# Patient Record
Sex: MALE | Race: WHITE | Employment: FULL TIME | URBAN - METROPOLITAN AREA
[De-identification: names, ages, dates, MRNs, and addresses within clinical notes are randomized per-mention and may not be internally consistent; named-entity substitution may affect disease eponyms.]

---

## 2023-04-20 ENCOUNTER — APPOINTMENT (OUTPATIENT)
Dept: PRIMARY CARE | Facility: CLINIC | Age: 53
End: 2023-04-20
Payer: COMMERCIAL

## 2023-04-20 ENCOUNTER — OFFICE VISIT (OUTPATIENT)
Dept: PRIMARY CARE | Facility: CLINIC | Age: 53
End: 2023-04-20
Payer: COMMERCIAL

## 2023-04-20 VITALS
HEIGHT: 71 IN | DIASTOLIC BLOOD PRESSURE: 84 MMHG | OXYGEN SATURATION: 98 % | SYSTOLIC BLOOD PRESSURE: 128 MMHG | BODY MASS INDEX: 30.66 KG/M2 | WEIGHT: 219 LBS | HEART RATE: 74 BPM | RESPIRATION RATE: 14 BRPM

## 2023-04-20 DIAGNOSIS — E78.2 MIXED HYPERLIPIDEMIA: ICD-10-CM

## 2023-04-20 DIAGNOSIS — K21.9 GASTROESOPHAGEAL REFLUX DISEASE WITHOUT ESOPHAGITIS: ICD-10-CM

## 2023-04-20 DIAGNOSIS — M79.671 CHRONIC PAIN OF BOTH FEET: ICD-10-CM

## 2023-04-20 DIAGNOSIS — R45.89 DEPRESSED MOOD: ICD-10-CM

## 2023-04-20 DIAGNOSIS — I10 PRIMARY HYPERTENSION: ICD-10-CM

## 2023-04-20 DIAGNOSIS — N52.9 ERECTILE DYSFUNCTION, UNSPECIFIED ERECTILE DYSFUNCTION TYPE: ICD-10-CM

## 2023-04-20 DIAGNOSIS — G89.29 CHRONIC PAIN OF BOTH FEET: ICD-10-CM

## 2023-04-20 DIAGNOSIS — G62.9 NEUROPATHY: Primary | ICD-10-CM

## 2023-04-20 DIAGNOSIS — M79.672 CHRONIC PAIN OF BOTH FEET: ICD-10-CM

## 2023-04-20 DIAGNOSIS — R40.0 DAYTIME SLEEPINESS: ICD-10-CM

## 2023-04-20 PROBLEM — R79.89 LOW TESTOSTERONE: Status: ACTIVE | Noted: 2023-04-20

## 2023-04-20 PROBLEM — G47.30 SLEEP APNEA: Status: ACTIVE | Noted: 2023-04-20

## 2023-04-20 PROBLEM — Z86.010 PERSONAL HISTORY OF COLONIC POLYPS: Status: ACTIVE | Noted: 2023-04-20

## 2023-04-20 PROBLEM — R94.128 ABNORMAL TYMPANOGRAM: Status: ACTIVE | Noted: 2023-04-20

## 2023-04-20 PROBLEM — N50.89 TESTICULAR MASS: Status: ACTIVE | Noted: 2023-04-20

## 2023-04-20 PROBLEM — Z86.0100 PERSONAL HISTORY OF COLONIC POLYPS: Status: ACTIVE | Noted: 2023-04-20

## 2023-04-20 PROBLEM — E78.5 HYPERLIPIDEMIA: Status: ACTIVE | Noted: 2023-04-20

## 2023-04-20 PROBLEM — K42.9 UMBILICAL HERNIA WITHOUT OBSTRUCTION AND WITHOUT GANGRENE: Status: ACTIVE | Noted: 2023-04-20

## 2023-04-20 PROBLEM — C62.91: Status: ACTIVE | Noted: 2023-04-20

## 2023-04-20 PROBLEM — R31.21 ASYMPTOMATIC MICROSCOPIC HEMATURIA: Status: ACTIVE | Noted: 2023-04-20

## 2023-04-20 PROCEDURE — 99204 OFFICE O/P NEW MOD 45 MIN: CPT | Performed by: INTERNAL MEDICINE

## 2023-04-20 PROCEDURE — 3074F SYST BP LT 130 MM HG: CPT | Performed by: INTERNAL MEDICINE

## 2023-04-20 PROCEDURE — 3079F DIAST BP 80-89 MM HG: CPT | Performed by: INTERNAL MEDICINE

## 2023-04-20 RX ORDER — TADALAFIL 5 MG/1
5 TABLET ORAL DAILY
COMMUNITY
Start: 2022-09-09 | End: 2024-04-24 | Stop reason: ALTCHOICE

## 2023-04-20 RX ORDER — DICLOFENAC SODIUM 10 MG/G
4 GEL TOPICAL 4 TIMES DAILY PRN
COMMUNITY
Start: 2022-12-09 | End: 2023-06-01 | Stop reason: ALTCHOICE

## 2023-04-20 RX ORDER — METOPROLOL SUCCINATE 25 MG/1
25 TABLET, EXTENDED RELEASE ORAL DAILY
COMMUNITY
End: 2023-05-09 | Stop reason: SDUPTHER

## 2023-04-20 RX ORDER — ALBUTEROL SULFATE 90 UG/1
2 AEROSOL, METERED RESPIRATORY (INHALATION) EVERY 4 HOURS PRN
COMMUNITY
Start: 2022-12-12 | End: 2023-06-01 | Stop reason: ALTCHOICE

## 2023-04-20 RX ORDER — FLUTICASONE PROPIONATE 50 MCG
2 SPRAY, SUSPENSION (ML) NASAL 2 TIMES DAILY
COMMUNITY
Start: 2023-01-06 | End: 2023-06-01 | Stop reason: ALTCHOICE

## 2023-04-20 RX ORDER — SILDENAFIL 100 MG/1
100 TABLET, FILM COATED ORAL AS NEEDED
COMMUNITY

## 2023-04-20 RX ORDER — PANTOPRAZOLE SODIUM 40 MG/1
40 TABLET, DELAYED RELEASE ORAL DAILY
COMMUNITY
End: 2023-04-25 | Stop reason: SDUPTHER

## 2023-04-20 RX ORDER — AZELASTINE 1 MG/ML
2 SPRAY, METERED NASAL 2 TIMES DAILY
COMMUNITY
Start: 2023-04-16 | End: 2023-06-01 | Stop reason: ALTCHOICE

## 2023-04-20 RX ORDER — ERGOCALCIFEROL 1.25 MG/1
50000 CAPSULE ORAL
COMMUNITY
Start: 2022-08-28 | End: 2023-05-09 | Stop reason: SDUPTHER

## 2023-04-20 RX ORDER — CLOTRIMAZOLE 1 G/ML
SOLUTION TOPICAL
COMMUNITY
Start: 2022-12-06 | End: 2023-06-01 | Stop reason: ALTCHOICE

## 2023-04-20 RX ORDER — AMLODIPINE BESYLATE 2.5 MG/1
2.5 TABLET ORAL DAILY
COMMUNITY
Start: 2023-02-21 | End: 2023-05-09 | Stop reason: SDUPTHER

## 2023-04-20 RX ORDER — GABAPENTIN 300 MG/1
300 CAPSULE ORAL 3 TIMES DAILY
Qty: 90 CAPSULE | Refills: 3 | Status: SHIPPED | OUTPATIENT
Start: 2023-04-20 | End: 2023-04-24 | Stop reason: SDUPTHER

## 2023-04-20 RX ORDER — IBUPROFEN 400 MG/1
400 TABLET ORAL EVERY 8 HOURS PRN
COMMUNITY
Start: 2020-12-29 | End: 2023-06-01 | Stop reason: ALTCHOICE

## 2023-04-20 RX ORDER — AMITRIPTYLINE HCL
POWDER (GRAM) MISCELLANEOUS
COMMUNITY
Start: 2023-04-10 | End: 2023-06-01

## 2023-04-20 RX ORDER — ATORVASTATIN CALCIUM 10 MG/1
10 TABLET, FILM COATED ORAL DAILY
COMMUNITY
End: 2023-04-21 | Stop reason: SDUPTHER

## 2023-04-20 RX ORDER — MELOXICAM 7.5 MG/1
7.5 TABLET ORAL DAILY
COMMUNITY
Start: 2022-12-05 | End: 2023-06-01 | Stop reason: ALTCHOICE

## 2023-04-20 RX ORDER — TESTOSTERONE CYPIONATE 200 MG/ML
200 INJECTION, SOLUTION INTRAMUSCULAR
COMMUNITY
End: 2024-04-24 | Stop reason: SDUPTHER

## 2023-04-20 ASSESSMENT — LIFESTYLE VARIABLES
AUDIT-C TOTAL SCORE: 0
SKIP TO QUESTIONS 9-10: 1
HOW OFTEN DO YOU HAVE A DRINK CONTAINING ALCOHOL: NEVER
HOW MANY STANDARD DRINKS CONTAINING ALCOHOL DO YOU HAVE ON A TYPICAL DAY: PATIENT DOES NOT DRINK
HOW OFTEN DO YOU HAVE SIX OR MORE DRINKS ON ONE OCCASION: NEVER

## 2023-04-20 ASSESSMENT — PATIENT HEALTH QUESTIONNAIRE - PHQ9
2. FEELING DOWN, DEPRESSED OR HOPELESS: NOT AT ALL
SUM OF ALL RESPONSES TO PHQ9 QUESTIONS 1 & 2: 0
1. LITTLE INTEREST OR PLEASURE IN DOING THINGS: NOT AT ALL

## 2023-04-20 ASSESSMENT — PAIN SCALES - GENERAL: PAINLEVEL: 8

## 2023-04-21 DIAGNOSIS — G62.9 NEUROPATHY: ICD-10-CM

## 2023-04-21 DIAGNOSIS — E78.5 HYPERLIPIDEMIA, UNSPECIFIED HYPERLIPIDEMIA TYPE: ICD-10-CM

## 2023-04-22 ENCOUNTER — LAB (OUTPATIENT)
Dept: LAB | Facility: LAB | Age: 53
End: 2023-04-22
Payer: COMMERCIAL

## 2023-04-22 DIAGNOSIS — G62.9 NEUROPATHY: ICD-10-CM

## 2023-04-22 DIAGNOSIS — R40.0 DAYTIME SLEEPINESS: ICD-10-CM

## 2023-04-22 PROCEDURE — 36415 COLL VENOUS BLD VENIPUNCTURE: CPT

## 2023-04-22 PROCEDURE — 83036 HEMOGLOBIN GLYCOSYLATED A1C: CPT

## 2023-04-23 LAB
ESTIMATED AVERAGE GLUCOSE FOR HBA1C: 103 MG/DL
HEMOGLOBIN A1C/HEMOGLOBIN TOTAL IN BLOOD: 5.2 %

## 2023-04-25 DIAGNOSIS — K21.9 GASTROESOPHAGEAL REFLUX DISEASE WITHOUT ESOPHAGITIS: ICD-10-CM

## 2023-04-25 RX ORDER — ATORVASTATIN CALCIUM 10 MG/1
10 TABLET, FILM COATED ORAL DAILY
Qty: 90 TABLET | Refills: 3 | Status: SHIPPED | OUTPATIENT
Start: 2023-04-25 | End: 2024-05-02

## 2023-04-25 RX ORDER — PANTOPRAZOLE SODIUM 40 MG/1
40 TABLET, DELAYED RELEASE ORAL DAILY
Qty: 90 TABLET | Refills: 3 | Status: SHIPPED | OUTPATIENT
Start: 2023-04-25 | End: 2024-05-30

## 2023-04-25 RX ORDER — GABAPENTIN 300 MG/1
300 CAPSULE ORAL 3 TIMES DAILY
Qty: 90 CAPSULE | Refills: 3 | Status: SHIPPED | OUTPATIENT
Start: 2023-04-25 | End: 2024-01-24 | Stop reason: SDUPTHER

## 2023-05-03 NOTE — PROGRESS NOTES
"Subjective   Jose Luis Paulson is a 52 y.o. male who presents for feet pain (Pt experiencing foot pain bilaterally. States he is on his feet a lot for work and noted them hurting quite badly 9/10. Pt states it is a dull throbbing pain. No edema noted. ).  Patient presents to establish care.  He request prescription refills.  He complains of bilateral foot pain.  Described as a dull ache.  Rated 9 out of 10.  Patient sees podiatry regularly.  He starts physical therapy tomorrow.  He had an MRI of his lumbar spine at an outside hospital ordered by podiatry.        Objective     /84 (BP Location: Left arm, Patient Position: Sitting, BP Cuff Size: Large adult)   Pulse 74   Resp 14   Ht 1.803 m (5' 11\")   Wt 99.3 kg (219 lb)   SpO2 98%   BMI 30.54 kg/m²      Physical Exam  Constitutional:       Appearance: Normal appearance.   HENT:      Head: Normocephalic and atraumatic.      Nose: Nose normal.      Mouth/Throat:      Mouth: Mucous membranes are moist.      Pharynx: Oropharynx is clear.   Eyes:      Extraocular Movements: Extraocular movements intact.      Pupils: Pupils are equal, round, and reactive to light.   Cardiovascular:      Rate and Rhythm: Normal rate and regular rhythm.   Pulmonary:      Effort: No respiratory distress.      Breath sounds: Normal breath sounds. No wheezing, rhonchi or rales.   Abdominal:      General: Bowel sounds are normal. There is no distension.      Palpations: Abdomen is soft.      Tenderness: There is no abdominal tenderness. There is no guarding.   Musculoskeletal:      Right lower leg: No edema.      Left lower leg: No edema.   Skin:     General: Skin is warm and dry.   Neurological:      Mental Status: He is alert and oriented to person, place, and time. Mental status is at baseline.   Psychiatric:         Mood and Affect: Mood normal.         Behavior: Behavior normal.         Assessment/Plan   Problem List Items Addressed This Visit          Nervous    Daytime sleepiness    " Relevant Orders    Hemoglobin A1c (Completed)       Circulatory    Hypertension       Digestive    GERD (gastroesophageal reflux disease)       Genitourinary    Erectile dysfunction       Musculoskeletal    Chronic pain of both feet       Other    Depressed mood    Hyperlipidemia     Other Visit Diagnoses       Neuropathy    -  Primary    Relevant Orders    Hemoglobin A1c (Completed)          Check hemoglobin A1c  Start gabapentin 300 mg 3 times daily  Continue all other medications as previously prescribed       Hai Sanchez DO

## 2023-05-09 DIAGNOSIS — I10 PRIMARY HYPERTENSION: ICD-10-CM

## 2023-05-09 DIAGNOSIS — R40.0 DAYTIME SLEEPINESS: ICD-10-CM

## 2023-05-09 RX ORDER — ERGOCALCIFEROL 1.25 MG/1
50000 CAPSULE ORAL
Qty: 12 CAPSULE | Refills: 3 | Status: SHIPPED | OUTPATIENT
Start: 2023-05-09 | End: 2024-05-28

## 2023-05-09 RX ORDER — METOPROLOL SUCCINATE 25 MG/1
25 TABLET, EXTENDED RELEASE ORAL DAILY
Qty: 90 TABLET | Refills: 3 | Status: SHIPPED | OUTPATIENT
Start: 2023-05-09 | End: 2024-05-30

## 2023-05-09 RX ORDER — AMLODIPINE BESYLATE 2.5 MG/1
2.5 TABLET ORAL DAILY
Qty: 90 TABLET | Refills: 3 | Status: SHIPPED | OUTPATIENT
Start: 2023-05-09

## 2023-05-25 ENCOUNTER — APPOINTMENT (OUTPATIENT)
Dept: PRIMARY CARE | Facility: CLINIC | Age: 53
End: 2023-05-25
Payer: COMMERCIAL

## 2023-05-25 PROBLEM — E66.811 OBESITY (BMI 30.0-34.9): Status: ACTIVE | Noted: 2023-05-25

## 2023-05-25 PROBLEM — R74.8 ABNORMAL LIVER ENZYMES: Status: ACTIVE | Noted: 2021-03-23

## 2023-05-25 PROBLEM — F17.210 CIGARETTE NICOTINE DEPENDENCE WITHOUT COMPLICATION: Status: ACTIVE | Noted: 2023-05-25

## 2023-05-25 PROBLEM — E66.9 OBESITY (BMI 30.0-34.9): Status: ACTIVE | Noted: 2023-05-25

## 2023-05-25 PROBLEM — B00.1 HERPESVIRAL VESICULAR DERMATITIS: Status: ACTIVE | Noted: 2020-01-11

## 2023-05-25 PROBLEM — E29.1 TESTICULAR HYPOFUNCTION: Status: ACTIVE | Noted: 2021-07-04

## 2023-05-25 PROBLEM — L30.1: Status: ACTIVE | Noted: 2020-07-26

## 2023-05-25 RX ORDER — PREDNISONE 50 MG/1
TABLET ORAL
COMMUNITY
Start: 2019-04-11 | End: 2023-06-01 | Stop reason: ALTCHOICE

## 2023-05-25 RX ORDER — TRAZODONE HYDROCHLORIDE 50 MG/1
TABLET ORAL
COMMUNITY
Start: 2019-07-11 | End: 2023-06-01 | Stop reason: ALTCHOICE

## 2023-05-25 RX ORDER — MUPIROCIN CALCIUM 20 MG/G
CREAM TOPICAL
COMMUNITY
Start: 2022-09-23 | End: 2023-06-01 | Stop reason: ALTCHOICE

## 2023-05-25 RX ORDER — MONTELUKAST SODIUM 10 MG/1
TABLET ORAL
COMMUNITY
Start: 2019-07-18 | End: 2023-06-01 | Stop reason: ALTCHOICE

## 2023-05-25 RX ORDER — NAPROXEN 500 MG/1
TABLET ORAL
COMMUNITY
Start: 2018-06-26 | End: 2023-06-01 | Stop reason: ALTCHOICE

## 2023-06-01 ENCOUNTER — OFFICE VISIT (OUTPATIENT)
Dept: PRIMARY CARE | Facility: CLINIC | Age: 53
End: 2023-06-01
Payer: COMMERCIAL

## 2023-06-01 VITALS — SYSTOLIC BLOOD PRESSURE: 118 MMHG | DIASTOLIC BLOOD PRESSURE: 74 MMHG

## 2023-06-01 DIAGNOSIS — C62.11 MALIGNANT NEOPLASM OF DESCENDED RIGHT TESTIS (MULTI): ICD-10-CM

## 2023-06-01 DIAGNOSIS — G47.33 OBSTRUCTIVE SLEEP APNEA SYNDROME: ICD-10-CM

## 2023-06-01 DIAGNOSIS — Z86.010 PERSONAL HISTORY OF COLONIC POLYPS: ICD-10-CM

## 2023-06-01 DIAGNOSIS — M79.672 CHRONIC PAIN OF BOTH FEET: ICD-10-CM

## 2023-06-01 DIAGNOSIS — M79.671 CHRONIC PAIN OF BOTH FEET: ICD-10-CM

## 2023-06-01 DIAGNOSIS — G89.29 CHRONIC PAIN OF BOTH FEET: ICD-10-CM

## 2023-06-01 DIAGNOSIS — G62.9 NEUROPATHY: ICD-10-CM

## 2023-06-01 DIAGNOSIS — K21.9 GASTROESOPHAGEAL REFLUX DISEASE WITHOUT ESOPHAGITIS: ICD-10-CM

## 2023-06-01 DIAGNOSIS — N52.9 ERECTILE DYSFUNCTION, UNSPECIFIED ERECTILE DYSFUNCTION TYPE: ICD-10-CM

## 2023-06-01 DIAGNOSIS — I10 PRIMARY HYPERTENSION: ICD-10-CM

## 2023-06-01 DIAGNOSIS — R45.89 DEPRESSED MOOD: Primary | ICD-10-CM

## 2023-06-01 DIAGNOSIS — E66.9 OBESITY (BMI 30.0-34.9): ICD-10-CM

## 2023-06-01 DIAGNOSIS — L73.9 FOLLICULITIS: ICD-10-CM

## 2023-06-01 DIAGNOSIS — E78.2 MIXED HYPERLIPIDEMIA: ICD-10-CM

## 2023-06-01 PROCEDURE — 3074F SYST BP LT 130 MM HG: CPT | Performed by: INTERNAL MEDICINE

## 2023-06-01 PROCEDURE — 3078F DIAST BP <80 MM HG: CPT | Performed by: INTERNAL MEDICINE

## 2023-06-01 PROCEDURE — 99214 OFFICE O/P EST MOD 30 MIN: CPT | Performed by: INTERNAL MEDICINE

## 2023-06-01 RX ORDER — IBUPROFEN 600 MG/1
TABLET ORAL
COMMUNITY
Start: 2023-05-19

## 2023-06-01 RX ORDER — BACITRACIN ZINC 500 UNIT/G
OINTMENT (GRAM) TOPICAL 2 TIMES DAILY
Qty: 14 G | Refills: 1 | Status: SHIPPED | OUTPATIENT
Start: 2023-06-01

## 2023-06-01 RX ORDER — NEBULIZER AND COMPRESSOR
EACH MISCELLANEOUS
Qty: 1 EACH | Refills: 0 | Status: SHIPPED | OUTPATIENT
Start: 2023-06-01

## 2023-06-01 RX ORDER — MONTELUKAST SODIUM 10 MG/1
TABLET ORAL
COMMUNITY
End: 2024-04-24 | Stop reason: ALTCHOICE

## 2023-06-01 ASSESSMENT — PAIN SCALES - GENERAL: PAINLEVEL: 0-NO PAIN

## 2023-06-01 NOTE — PROGRESS NOTES
Subjective   Jose Luis Paulson is a 52 y.o. male who presents for Annual Exam.  Patient presents for a follow-up.  Blood pressure controlled in the office at 118/74.  No headaches or chest pain.  Patient does not check his blood pressure at home.  He does not have a blood pressure cuff.  Patient had a colonoscopy last month was found to have 8 polyps.  He will need to return in 3 months.  Labs reviewed, hemoglobin A1c 5.2%.  He has not started gabapentin yet for his foot pain.  He is following with podiatry ago.        Objective     /74      Physical Exam  Constitutional:       Appearance: Normal appearance.   HENT:      Head: Normocephalic and atraumatic.      Nose: Nose normal.      Mouth/Throat:      Mouth: Mucous membranes are moist.      Pharynx: Oropharynx is clear.   Eyes:      Extraocular Movements: Extraocular movements intact.      Pupils: Pupils are equal, round, and reactive to light.   Cardiovascular:      Rate and Rhythm: Normal rate and regular rhythm.   Pulmonary:      Effort: No respiratory distress.      Breath sounds: Normal breath sounds. No wheezing, rhonchi or rales.   Abdominal:      General: Bowel sounds are normal. There is no distension.      Palpations: Abdomen is soft.      Tenderness: There is no abdominal tenderness. There is no guarding.   Musculoskeletal:      Right lower leg: No edema.      Left lower leg: No edema.   Skin:     General: Skin is warm and dry.   Neurological:      Mental Status: He is alert and oriented to person, place, and time. Mental status is at baseline.   Psychiatric:         Mood and Affect: Mood normal.         Behavior: Behavior normal.         Assessment/Plan   Problem List Items Addressed This Visit       Chronic pain of both feet    Depressed mood - Primary    Erectile dysfunction    GERD (gastroesophageal reflux disease)    Hyperlipidemia    Hypertension    Relevant Medications    miscellaneous medical supply (Blood Pressure Cuff) misc    Other Relevant  Orders    CBC    Comprehensive Metabolic Panel    Lipid Panel    Prostate Specific Antigen    Thyroid Stimulating Hormone    Vitamin D 1,25 Dihydroxy    Personal history of colonic polyps    Right testicular cancer (CMS/HCC)    Sleep apnea    Obesity (BMI 30.0-34.9)     Other Visit Diagnoses       Neuropathy        Folliculitis        Relevant Medications    bacitracin 500 unit/gram ointment          Continue medications as previously prescribed  Repeat colonoscopy in 2023 due to colon polyps  Return in September for full physical       Hai Sanchez DO

## 2023-07-01 LAB
ALPHA-1 FETOPROTEIN (NG/ML) IN SER/PLAS: <4 NG/ML (ref 0–9)
ANION GAP IN SER/PLAS: 10 MMOL/L (ref 10–20)
CALCIUM (MG/DL) IN SER/PLAS: 8.8 MG/DL (ref 8.6–10.3)
CARBON DIOXIDE, TOTAL (MMOL/L) IN SER/PLAS: 25 MMOL/L (ref 21–32)
CHLORIDE (MMOL/L) IN SER/PLAS: 109 MMOL/L (ref 98–107)
CREATININE (MG/DL) IN SER/PLAS: 0.93 MG/DL (ref 0.5–1.3)
ERYTHROCYTE DISTRIBUTION WIDTH (RATIO) BY AUTOMATED COUNT: 12.7 % (ref 11.5–14.5)
ERYTHROCYTE MEAN CORPUSCULAR HEMOGLOBIN CONCENTRATION (G/DL) BY AUTOMATED: 33.6 G/DL (ref 32–36)
ERYTHROCYTE MEAN CORPUSCULAR VOLUME (FL) BY AUTOMATED COUNT: 98 FL (ref 80–100)
ERYTHROCYTES (10*6/UL) IN BLOOD BY AUTOMATED COUNT: 4.72 X10E12/L (ref 4.5–5.9)
ESTRADIOL (PG/ML) IN SER/PLAS: 26 PG/ML
GFR MALE: >90 ML/MIN/1.73M2
GLUCOSE (MG/DL) IN SER/PLAS: 89 MG/DL (ref 74–99)
HCG TUMOR MARKER: <3 IU/L
HEMATOCRIT (%) IN BLOOD BY AUTOMATED COUNT: 46.4 % (ref 41–52)
HEMOGLOBIN (G/DL) IN BLOOD: 15.6 G/DL (ref 13.5–17.5)
LEUKOCYTES (10*3/UL) IN BLOOD BY AUTOMATED COUNT: 6.6 X10E9/L (ref 4.4–11.3)
NRBC (PER 100 WBCS) BY AUTOMATED COUNT: 0 /100 WBC (ref 0–0)
PLATELETS (10*3/UL) IN BLOOD AUTOMATED COUNT: 157 X10E9/L (ref 150–450)
POTASSIUM (MMOL/L) IN SER/PLAS: 3.8 MMOL/L (ref 3.5–5.3)
PROSTATE SPECIFIC AG (NG/ML) IN SER/PLAS: 0.77 NG/ML (ref 0–4)
SODIUM (MMOL/L) IN SER/PLAS: 140 MMOL/L (ref 136–145)
TESTOSTERONE (NG/DL) IN SER/PLAS: 259 NG/DL (ref 240–1000)
UREA NITROGEN (MG/DL) IN SER/PLAS: 19 MG/DL (ref 6–23)

## 2024-01-10 ENCOUNTER — LAB (OUTPATIENT)
Dept: LAB | Facility: LAB | Age: 54
End: 2024-01-10
Payer: COMMERCIAL

## 2024-01-10 DIAGNOSIS — N50.9 DISORDER OF MALE GENITAL ORGANS, UNSPECIFIED: Primary | ICD-10-CM

## 2024-01-10 DIAGNOSIS — I10 PRIMARY HYPERTENSION: ICD-10-CM

## 2024-01-10 LAB
AFP SERPL-MCNC: <4 NG/ML (ref 0–9)
ALBUMIN SERPL BCP-MCNC: 4.3 G/DL (ref 3.4–5)
ALP SERPL-CCNC: 94 U/L (ref 33–120)
ALT SERPL W P-5'-P-CCNC: 56 U/L (ref 10–52)
ANION GAP SERPL CALC-SCNC: 11 MMOL/L (ref 10–20)
AST SERPL W P-5'-P-CCNC: 33 U/L (ref 9–39)
B-HCG SERPL-ACNC: <3 MIU/ML
BILIRUB SERPL-MCNC: 0.5 MG/DL (ref 0–1.2)
BUN SERPL-MCNC: 18 MG/DL (ref 6–23)
CALCIUM SERPL-MCNC: 9.8 MG/DL (ref 8.6–10.3)
CHLORIDE SERPL-SCNC: 104 MMOL/L (ref 98–107)
CHOLEST SERPL-MCNC: 129 MG/DL (ref 0–199)
CHOLESTEROL/HDL RATIO: 3.7
CO2 SERPL-SCNC: 29 MMOL/L (ref 21–32)
CREAT SERPL-MCNC: 0.89 MG/DL (ref 0.5–1.3)
EGFRCR SERPLBLD CKD-EPI 2021: >90 ML/MIN/1.73M*2
ERYTHROCYTE [DISTWIDTH] IN BLOOD BY AUTOMATED COUNT: 12.4 % (ref 11.5–14.5)
GLUCOSE SERPL-MCNC: 121 MG/DL (ref 74–99)
HCT VFR BLD AUTO: 46.7 % (ref 41–52)
HDLC SERPL-MCNC: 35 MG/DL
HGB BLD-MCNC: 16.3 G/DL (ref 13.5–17.5)
LDLC SERPL CALC-MCNC: 63 MG/DL
MCH RBC QN AUTO: 34.2 PG (ref 26–34)
MCHC RBC AUTO-ENTMCNC: 34.9 G/DL (ref 32–36)
MCV RBC AUTO: 98 FL (ref 80–100)
NON HDL CHOLESTEROL: 94 MG/DL (ref 0–149)
NRBC BLD-RTO: 0 /100 WBCS (ref 0–0)
PLATELET # BLD AUTO: 120 X10*3/UL (ref 150–450)
POTASSIUM SERPL-SCNC: 4.4 MMOL/L (ref 3.5–5.3)
PROT SERPL-MCNC: 7.2 G/DL (ref 6.4–8.2)
PSA SERPL-MCNC: 0.63 NG/ML
RBC # BLD AUTO: 4.76 X10*6/UL (ref 4.5–5.9)
SODIUM SERPL-SCNC: 140 MMOL/L (ref 136–145)
TRIGL SERPL-MCNC: 154 MG/DL (ref 0–149)
TSH SERPL-ACNC: 1.12 MIU/L (ref 0.44–3.98)
VLDL: 31 MG/DL (ref 0–40)
WBC # BLD AUTO: 4.7 X10*3/UL (ref 4.4–11.3)

## 2024-01-10 PROCEDURE — 85027 COMPLETE CBC AUTOMATED: CPT

## 2024-01-10 PROCEDURE — 80061 LIPID PANEL: CPT

## 2024-01-10 PROCEDURE — 84153 ASSAY OF PSA TOTAL: CPT

## 2024-01-10 PROCEDURE — 82652 VIT D 1 25-DIHYDROXY: CPT

## 2024-01-10 PROCEDURE — 84702 CHORIONIC GONADOTROPIN TEST: CPT

## 2024-01-10 PROCEDURE — 80053 COMPREHEN METABOLIC PANEL: CPT

## 2024-01-10 PROCEDURE — 82105 ALPHA-FETOPROTEIN SERUM: CPT

## 2024-01-10 PROCEDURE — 36415 COLL VENOUS BLD VENIPUNCTURE: CPT

## 2024-01-10 PROCEDURE — 84443 ASSAY THYROID STIM HORMONE: CPT

## 2024-01-12 LAB — 1,25(OH)2D3 SERPL-MCNC: 56.5 PG/ML (ref 19.9–79.3)

## 2024-01-24 ENCOUNTER — HOSPITAL ENCOUNTER (OUTPATIENT)
Dept: RADIOLOGY | Facility: HOSPITAL | Age: 54
Discharge: HOME | End: 2024-01-24
Payer: COMMERCIAL

## 2024-01-24 ENCOUNTER — OFFICE VISIT (OUTPATIENT)
Dept: PRIMARY CARE | Facility: CLINIC | Age: 54
End: 2024-01-24
Payer: COMMERCIAL

## 2024-01-24 VITALS
RESPIRATION RATE: 16 BRPM | SYSTOLIC BLOOD PRESSURE: 138 MMHG | BODY MASS INDEX: 29.4 KG/M2 | HEIGHT: 71 IN | DIASTOLIC BLOOD PRESSURE: 86 MMHG | WEIGHT: 210 LBS | HEART RATE: 78 BPM | OXYGEN SATURATION: 97 %

## 2024-01-24 DIAGNOSIS — J01.00 ACUTE NON-RECURRENT MAXILLARY SINUSITIS: ICD-10-CM

## 2024-01-24 DIAGNOSIS — G62.9 NEUROPATHY: ICD-10-CM

## 2024-01-24 DIAGNOSIS — R14.0 BLOATING: ICD-10-CM

## 2024-01-24 DIAGNOSIS — F17.210 CIGARETTE NICOTINE DEPENDENCE WITHOUT COMPLICATION: ICD-10-CM

## 2024-01-24 DIAGNOSIS — M79.671 CHRONIC PAIN OF BOTH FEET: ICD-10-CM

## 2024-01-24 DIAGNOSIS — M79.672 CHRONIC PAIN OF BOTH FEET: ICD-10-CM

## 2024-01-24 DIAGNOSIS — G89.29 CHRONIC PAIN OF BOTH FEET: ICD-10-CM

## 2024-01-24 DIAGNOSIS — N50.89 OTHER SPECIFIED DISORDERS OF THE MALE GENITAL ORGANS: ICD-10-CM

## 2024-01-24 DIAGNOSIS — I73.9 PVD (PERIPHERAL VASCULAR DISEASE) (CMS-HCC): Primary | ICD-10-CM

## 2024-01-24 DIAGNOSIS — I10 PRIMARY HYPERTENSION: ICD-10-CM

## 2024-01-24 PROCEDURE — 99214 OFFICE O/P EST MOD 30 MIN: CPT | Performed by: INTERNAL MEDICINE

## 2024-01-24 PROCEDURE — 74177 CT ABD & PELVIS W/CONTRAST: CPT | Performed by: RADIOLOGY

## 2024-01-24 PROCEDURE — 3075F SYST BP GE 130 - 139MM HG: CPT | Performed by: INTERNAL MEDICINE

## 2024-01-24 PROCEDURE — 2550000001 HC RX 255 CONTRASTS: Performed by: NURSE PRACTITIONER

## 2024-01-24 PROCEDURE — 3079F DIAST BP 80-89 MM HG: CPT | Performed by: INTERNAL MEDICINE

## 2024-01-24 PROCEDURE — 74177 CT ABD & PELVIS W/CONTRAST: CPT

## 2024-01-24 RX ORDER — AZITHROMYCIN 250 MG/1
TABLET, FILM COATED ORAL
Qty: 6 TABLET | Refills: 0 | Status: SHIPPED | OUTPATIENT
Start: 2024-01-24 | End: 2024-01-29

## 2024-01-24 RX ORDER — GABAPENTIN 400 MG/1
400 CAPSULE ORAL 3 TIMES DAILY
Qty: 90 CAPSULE | Refills: 3 | Status: SHIPPED | OUTPATIENT
Start: 2024-01-24 | End: 2024-07-22

## 2024-01-24 RX ORDER — IBUPROFEN 200 MG
1 TABLET ORAL DAILY
Qty: 90 CAPSULE | Refills: 0 | Status: SHIPPED | OUTPATIENT
Start: 2024-01-24 | End: 2024-04-24 | Stop reason: ALTCHOICE

## 2024-01-24 RX ADMIN — IOHEXOL 75 ML: 350 INJECTION, SOLUTION INTRAVENOUS at 13:45

## 2024-01-24 ASSESSMENT — PAIN SCALES - GENERAL: PAINLEVEL: 8

## 2024-01-25 NOTE — ASSESSMENT & PLAN NOTE
Increase gabapentin to 400 3 times daily  Referral for a second podiatry opinion  Check ABIs and PVRs

## 2024-01-25 NOTE — PROGRESS NOTES
"Subjective   Jose Luis Paulson is a 53 y.o. male who presents for Sick Visit (Foot pain bilaterally,dark black stools, sometimes liquid stools , very achy and missing a lot of work ).  Patient presents for foot pain.  It is in both feet.  He has a combination of osteoarthritis in his feet and neuropathy.  He is following with podiatry.  He is uncertain if gabapentin is helping.  However, it is not making him sleepy.  Patient noticed to have decreased hair on the lower parts of his legs.  His toes are cold to touch compared to the rest of his foot.  He is uncertain if he has had blood flow studies performed.  Patient complains of bloating.  Discussed Gas-X and probiotic.  Patient also complains of sinus congestion for the last 2+ weeks.  Symptoms initially improved but then got worse.  Discussed he likely had a viral infection followed by bacterial infection.        Objective     /86 (BP Location: Left arm, Patient Position: Sitting, BP Cuff Size: Adult)   Pulse 78   Resp 16   Ht 1.803 m (5' 11\")   Wt 95.3 kg (210 lb)   SpO2 97%   BMI 29.29 kg/m²      Physical Exam  Constitutional:       Appearance: Normal appearance.   HENT:      Head: Normocephalic and atraumatic.      Nose: Nose normal.      Mouth/Throat:      Mouth: Mucous membranes are moist.      Pharynx: Oropharynx is clear.   Eyes:      Extraocular Movements: Extraocular movements intact.      Pupils: Pupils are equal, round, and reactive to light.   Cardiovascular:      Rate and Rhythm: Normal rate and regular rhythm.   Pulmonary:      Effort: No respiratory distress.      Breath sounds: Normal breath sounds. No wheezing, rhonchi or rales.   Abdominal:      General: Bowel sounds are normal. There is no distension.      Palpations: Abdomen is soft.      Tenderness: There is no abdominal tenderness. There is no guarding.   Musculoskeletal:      Right lower leg: No edema.      Left lower leg: No edema.   Skin:     General: Skin is warm and dry. "   Neurological:      Mental Status: He is alert and oriented to person, place, and time. Mental status is at baseline.   Psychiatric:         Mood and Affect: Mood normal.         Behavior: Behavior normal.         Assessment/Plan   Problem List Items Addressed This Visit       Chronic pain of both feet     Increase gabapentin to 400 3 times daily  Referral for a second podiatry opinion  Check ABIs and PVRs         Hypertension     Continue current medications         Cigarette nicotine dependence without complication     Patient encouraged to stop smoking          Other Visit Diagnoses       PVD (peripheral vascular disease) (CMS/[facility])    -  Primary    Relevant Orders    Ankle brachial index    Vascular US PVR without exercise    Neuropathy        Relevant Medications    gabapentin (Neurontin) 400 mg capsule    Other Relevant Orders    Referral to Podiatry    Bloating        Relevant Medications    lactobacillus acidophilus (Acidophilus) capsule    Acute non-recurrent maxillary sinusitis        Relevant Medications    azithromycin (Zithromax) 250 mg tablet          Follow-up on IONA and PVR in 3 months  Will consider increasing gabapentin if indicated       Hai Sanchez DO

## 2024-02-02 ENCOUNTER — APPOINTMENT (OUTPATIENT)
Dept: UROLOGY | Facility: CLINIC | Age: 54
End: 2024-02-02
Payer: COMMERCIAL

## 2024-02-07 ENCOUNTER — HOSPITAL ENCOUNTER (OUTPATIENT)
Dept: VASCULAR MEDICINE | Facility: HOSPITAL | Age: 54
End: 2024-02-07
Payer: COMMERCIAL

## 2024-02-07 ENCOUNTER — HOSPITAL ENCOUNTER (OUTPATIENT)
Dept: VASCULAR MEDICINE | Facility: HOSPITAL | Age: 54
Discharge: HOME | End: 2024-02-07
Payer: COMMERCIAL

## 2024-02-07 DIAGNOSIS — I73.9 PVD (PERIPHERAL VASCULAR DISEASE) (CMS-HCC): ICD-10-CM

## 2024-02-07 DIAGNOSIS — I70.213 ATHEROSCLEROSIS OF NATIVE ARTERIES OF EXTREMITIES WITH INTERMITTENT CLAUDICATION, BILATERAL LEGS (CMS-HCC): ICD-10-CM

## 2024-02-07 PROCEDURE — 93922 UPR/L XTREMITY ART 2 LEVELS: CPT | Performed by: SURGERY

## 2024-02-07 PROCEDURE — 93922 UPR/L XTREMITY ART 2 LEVELS: CPT

## 2024-02-21 ENCOUNTER — OFFICE VISIT (OUTPATIENT)
Dept: PODIATRY | Facility: CLINIC | Age: 54
End: 2024-02-21
Payer: COMMERCIAL

## 2024-02-21 ENCOUNTER — LAB (OUTPATIENT)
Dept: LAB | Facility: LAB | Age: 54
End: 2024-02-21
Payer: COMMERCIAL

## 2024-02-21 ENCOUNTER — HOSPITAL ENCOUNTER (OUTPATIENT)
Dept: RADIOLOGY | Facility: CLINIC | Age: 54
Discharge: HOME | End: 2024-02-21
Payer: COMMERCIAL

## 2024-02-21 DIAGNOSIS — M79.672 PAIN IN BOTH FEET: Primary | ICD-10-CM

## 2024-02-21 DIAGNOSIS — M79.672 PAIN IN BOTH FEET: ICD-10-CM

## 2024-02-21 DIAGNOSIS — R26.89 ANTALGIC GAIT: ICD-10-CM

## 2024-02-21 DIAGNOSIS — M25.572 SINUS TARSI SYNDROME OF BOTH ANKLES: ICD-10-CM

## 2024-02-21 DIAGNOSIS — M21.41 PES PLANUS OF BOTH FEET: ICD-10-CM

## 2024-02-21 DIAGNOSIS — M25.571 SINUS TARSI SYNDROME OF BOTH ANKLES: ICD-10-CM

## 2024-02-21 DIAGNOSIS — M79.671 PAIN IN BOTH FEET: ICD-10-CM

## 2024-02-21 DIAGNOSIS — M79.671 PAIN IN BOTH FEET: Primary | ICD-10-CM

## 2024-02-21 DIAGNOSIS — M72.2 PLANTAR FASCIITIS: ICD-10-CM

## 2024-02-21 DIAGNOSIS — M21.42 PES PLANUS OF BOTH FEET: ICD-10-CM

## 2024-02-21 LAB
CRP SERPL-MCNC: 0.14 MG/DL
ERYTHROCYTE [SEDIMENTATION RATE] IN BLOOD BY WESTERGREN METHOD: 4 MM/H (ref 0–20)
RHEUMATOID FACT SER NEPH-ACNC: <10 IU/ML (ref 0–15)

## 2024-02-21 PROCEDURE — 99204 OFFICE O/P NEW MOD 45 MIN: CPT | Performed by: PODIATRIST

## 2024-02-21 PROCEDURE — 85652 RBC SED RATE AUTOMATED: CPT

## 2024-02-21 PROCEDURE — 36415 COLL VENOUS BLD VENIPUNCTURE: CPT

## 2024-02-21 PROCEDURE — 86140 C-REACTIVE PROTEIN: CPT

## 2024-02-21 PROCEDURE — 86038 ANTINUCLEAR ANTIBODIES: CPT

## 2024-02-21 PROCEDURE — 73630 X-RAY EXAM OF FOOT: CPT | Mod: BILATERAL PROCEDURE | Performed by: RADIOLOGY

## 2024-02-21 PROCEDURE — 86431 RHEUMATOID FACTOR QUANT: CPT

## 2024-02-21 PROCEDURE — 73630 X-RAY EXAM OF FOOT: CPT | Mod: 50

## 2024-02-21 ASSESSMENT — ENCOUNTER SYMPTOMS
RESPIRATORY NEGATIVE: 1
HEMATOLOGIC/LYMPHATIC NEGATIVE: 1
CONSTITUTIONAL NEGATIVE: 1
EYES NEGATIVE: 1
ARTHRALGIAS: 1
ALLERGIC/IMMUNOLOGIC NEGATIVE: 1
PSYCHIATRIC NEGATIVE: 1
CARDIOVASCULAR NEGATIVE: 1
GASTROINTESTINAL NEGATIVE: 1
ENDOCRINE NEGATIVE: 1

## 2024-02-21 NOTE — PROGRESS NOTES
"Chief Complaint   Patient presents with    Foot Pain     New Patient is here today for foot  pain and neuropathy CARRIE. Started 3 years ago. PCP referred.  Denies of any injury or trama to his feet CARRIE.  Tried several creams and OTC medication, with no relief.    Painful feet x 3 years. Hard to tell what areas hurt.  Has worn inserts.  Try topical medications.  Has had physical therapy.  All without success.  Describes \"feet always hurt\".  Has seen other DPMs without success.  Post static pain. L>R.  Being off the feet does not seem to help.  Works at [employer].  Mostly stands all day.  PVR studies were done just recently.  Gabapentin for the past few weeks doesn't seem to help.   Ibuprofen seems to help when he takes it.   Is a smoker.    Review of Systems   Constitutional: Negative.    HENT: Negative.     Eyes: Negative.    Respiratory: Negative.     Cardiovascular: Negative.    Gastrointestinal: Negative.    Endocrine: Negative.    Genitourinary: Negative.    Musculoskeletal:  Positive for arthralgias and gait problem.   Allergic/Immunologic: Negative.    Hematological: Negative.    Psychiatric/Behavioral: Negative.         General/Constitutional: Alert. NAD.   Respiratory: Non labored breathing.   Psychiatric: Mood and affect normal/baseline.   HEENT: Sclera clear. Wearing corrective lenses.  Dermatologic: Nails are dystrophic. No acute inflammatory infectious process. Web spaces are dry. No ulcers no pre-ulcerative areas.  Vascular: Pedal pulses are intact and symmetric including the dorsalis pedis and the posterior tibial pulses. Feet are warm to touch. No swelling appreciated either extremity.  Neurological: Alert and oriented. No acute distress.  Sensation intact  Musculoskeletal: Strength is normal for age. No acute deficits appreciated.  Major muscle groups are intact.  No deficits noted.  Pes planus deformity noted.  Likely midfoot DJD.  Perhaps mild plantar discomfort on palpation.  No sinus tarsi pain at " this time though has had this before.  PVR studies: Normal findings.      -Today's treatment and course of therapy was discussed with the patient in detail. Patient's questions were answered. Proper foot care was discussed. This dictation was done using Dragon computer software and as such may contain grammatical errors.    -Most recent blood work was evaluated.  Vascular studies evaluated and discussed with patient.    -Will order inflammatory markers CRP sed rate COURTNEY and rheumatoid factor.    -Will order new x-rays weightbearing both feet.    -Discussed smoking cessation.

## 2024-02-22 LAB — ANA SER QL HEP2 SUBST: NEGATIVE

## 2024-03-01 ENCOUNTER — OFFICE VISIT (OUTPATIENT)
Dept: UROLOGY | Facility: CLINIC | Age: 54
End: 2024-03-01
Payer: COMMERCIAL

## 2024-03-01 VITALS
DIASTOLIC BLOOD PRESSURE: 78 MMHG | WEIGHT: 219 LBS | TEMPERATURE: 97.1 F | HEART RATE: 74 BPM | BODY MASS INDEX: 30.54 KG/M2 | SYSTOLIC BLOOD PRESSURE: 137 MMHG

## 2024-03-01 DIAGNOSIS — C62.91 MALIGNANT NEOPLASM OF RIGHT TESTIS, UNSPECIFIED WHETHER DESCENDED OR UNDESCENDED (MULTI): Primary | ICD-10-CM

## 2024-03-01 PROCEDURE — 99213 OFFICE O/P EST LOW 20 MIN: CPT | Performed by: STUDENT IN AN ORGANIZED HEALTH CARE EDUCATION/TRAINING PROGRAM

## 2024-03-01 PROCEDURE — 3075F SYST BP GE 130 - 139MM HG: CPT | Performed by: STUDENT IN AN ORGANIZED HEALTH CARE EDUCATION/TRAINING PROGRAM

## 2024-03-01 PROCEDURE — 3078F DIAST BP <80 MM HG: CPT | Performed by: STUDENT IN AN ORGANIZED HEALTH CARE EDUCATION/TRAINING PROGRAM

## 2024-03-01 PROCEDURE — G2211 COMPLEX E/M VISIT ADD ON: HCPCS | Performed by: STUDENT IN AN ORGANIZED HEALTH CARE EDUCATION/TRAINING PROGRAM

## 2024-03-01 NOTE — PROGRESS NOTES
Subjective     Jose Luis Paulson is a 53 y.o. 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     He is doing well. He has questions regarding testosterone replacement. He also has concerns with a malodorous scent coming from his genital area.      Pre-operative STM were negative. Recent STM are normal. CT scan is normal and showed no evidence of lymph node enlargement or retroperineal lymph node recurrence. He did not have CXR done for this visit.      He denies issues with remaining testicle.      Past medical, surgical, family and social history were reviewed and unchanged since last visit besides what is in the HPI.                  Review of Systems   All other systems reviewed and are negative.      Objective   Physical Exam  Gen: No acute distress     Psych: Alert and oriented x3     Neuro:  Normal ROM    Resp: Nonlabored respirations     CV: Regular rate and rhythm     Abd: S, NT, ND.     : Deferred    Skin: Warm, dry and intact without rashes     Lymphatics: No peripheral edema       Assessment/Plan   Problem List Items Addressed This Visit             ICD-10-CM    Right testicular cancer (CMS/HCC) - Primary C62.91     I reviewed the patient's CT results and we discussed his imaging in detail. CT scan is normal and showed no evidence of lymph node enlargement or retroperineal lymph node recurrence. STM are normal. He did not have CXR done for this visit. I encouraged him to have this done now.     We talked through some of his other concerns. I will refer him back to Dr. Interiano for management of testosterone replacement.      We discussed monthly self examinations of his remaining testicle for lumps. Surveillance is now PE, STM, and CXR, CT scan A/P with IV contrast q yearly for years 3-5.               Relevant Orders    Alpha-Fetoprotein    Human  Chorionic Gonadotropin, Serum Quantitative    CT abdomen pelvis w IV contrast    XR chest 2 views    Creatinine, Serum            Plan:  CXR PA + Lateral now   1 year fUV with STM, CT A/P with IV contrast and CXR  Referral back to Dr. Interiano for management of testosterone replacement and monitoring, his T levels were a bit low back in July.      Scribe Attestation  By signing my name below, I, Katelyn Casalla, Scribe   attest that this documentation has been prepared under the direction and in the presence of Davi Estes MD MPH.

## 2024-03-01 NOTE — ASSESSMENT & PLAN NOTE
I reviewed the patient's CT results and we discussed his imaging in detail. CT scan is normal and showed no evidence of lymph node enlargement or retroperineal lymph node recurrence. STM are normal. He did not have CXR done for this visit. I encouraged him to have this done now.     We talked through some of his other concerns. I will refer him back to Dr. Interiano for management of testosterone replacement.      We discussed monthly self examinations of his remaining testicle for lumps. Surveillance is now PE, STM, and CXR, CT scan A/P with IV contrast q yearly for years 3-5.

## 2024-03-16 ENCOUNTER — HOSPITAL ENCOUNTER (OUTPATIENT)
Dept: RADIOLOGY | Facility: HOSPITAL | Age: 54
Discharge: HOME | End: 2024-03-16
Payer: COMMERCIAL

## 2024-03-16 DIAGNOSIS — C62.91 MALIGNANT NEOPLASM OF RIGHT TESTIS, UNSPECIFIED WHETHER DESCENDED OR UNDESCENDED (MULTI): ICD-10-CM

## 2024-03-16 PROCEDURE — 71046 X-RAY EXAM CHEST 2 VIEWS: CPT | Performed by: RADIOLOGY

## 2024-03-16 PROCEDURE — 71046 X-RAY EXAM CHEST 2 VIEWS: CPT

## 2024-03-19 PROBLEM — R09.81 NASAL CONGESTION: Status: ACTIVE | Noted: 2024-03-19

## 2024-03-19 PROBLEM — M25.572 SINUS TARSI SYNDROME OF BOTH ANKLES: Status: ACTIVE | Noted: 2024-03-19

## 2024-03-19 PROBLEM — J34.3 NASAL TURBINATE HYPERTROPHY: Status: ACTIVE | Noted: 2024-03-19

## 2024-03-19 PROBLEM — J34.2 DNS (DEVIATED NASAL SEPTUM): Status: ACTIVE | Noted: 2024-03-19

## 2024-03-19 PROBLEM — L73.9 FOLLICULITIS: Status: ACTIVE | Noted: 2024-03-19

## 2024-03-19 PROBLEM — R14.0 ABDOMINAL BLOATING: Status: ACTIVE | Noted: 2024-03-19

## 2024-03-19 PROBLEM — J01.00 ACUTE MAXILLARY SINUSITIS: Status: ACTIVE | Noted: 2024-03-19

## 2024-03-19 PROBLEM — M72.2 PLANTAR FASCIITIS: Status: ACTIVE | Noted: 2024-03-19

## 2024-03-19 PROBLEM — H92.02 LEFT EAR PAIN: Status: ACTIVE | Noted: 2024-03-19

## 2024-03-19 PROBLEM — R53.82 CHRONIC FATIGUE: Status: ACTIVE | Noted: 2024-03-19

## 2024-03-19 PROBLEM — G62.9 NEUROPATHY: Status: ACTIVE | Noted: 2023-04-22

## 2024-03-19 PROBLEM — M21.40 PES PLANUS: Status: ACTIVE | Noted: 2024-03-19

## 2024-03-19 PROBLEM — N50.9 DISORDER OF MALE GENITAL ORGANS: Status: ACTIVE | Noted: 2024-01-10

## 2024-03-19 PROBLEM — M25.571 SINUS TARSI SYNDROME OF BOTH ANKLES: Status: ACTIVE | Noted: 2024-03-19

## 2024-03-19 PROBLEM — R26.89 ANTALGIC GAIT: Status: ACTIVE | Noted: 2024-03-19

## 2024-03-20 ENCOUNTER — OFFICE VISIT (OUTPATIENT)
Dept: PODIATRY | Facility: CLINIC | Age: 54
End: 2024-03-20
Payer: COMMERCIAL

## 2024-03-20 DIAGNOSIS — R26.81 GAIT INSTABILITY: ICD-10-CM

## 2024-03-20 DIAGNOSIS — M79.671 PAIN IN BOTH FEET: Primary | ICD-10-CM

## 2024-03-20 DIAGNOSIS — M72.2 PLANTAR FASCIITIS: ICD-10-CM

## 2024-03-20 DIAGNOSIS — M79.672 PAIN IN BOTH FEET: Primary | ICD-10-CM

## 2024-03-20 PROCEDURE — 99214 OFFICE O/P EST MOD 30 MIN: CPT | Performed by: PODIATRIST

## 2024-03-20 RX ORDER — METHYLPREDNISOLONE 4 MG/1
TABLET ORAL
Qty: 21 TABLET | Refills: 0 | Status: SHIPPED | OUTPATIENT
Start: 2024-03-20 | End: 2024-03-27

## 2024-03-20 ASSESSMENT — ENCOUNTER SYMPTOMS
ARTHRALGIAS: 1
HEMATOLOGIC/LYMPHATIC NEGATIVE: 1
EYES NEGATIVE: 1
ENDOCRINE NEGATIVE: 1
CARDIOVASCULAR NEGATIVE: 1
ALLERGIC/IMMUNOLOGIC NEGATIVE: 1
RESPIRATORY NEGATIVE: 1
PSYCHIATRIC NEGATIVE: 1
GASTROINTESTINAL NEGATIVE: 1
CONSTITUTIONAL NEGATIVE: 1

## 2024-03-20 NOTE — PROGRESS NOTES
"Chief Complaint   Patient presents with    Follow-up     Patient is here today for a follow up on foot pain CARRIE and to review xrays. Patient is still in a lot of pain CARRIE     FU bilateral foot pain. No changes with symptoms.  Admits to weightbearing pain.  This is ongoing and progressive.  Any clinical changes.     Painful feet x 3 years. Hard to tell what areas hurt.  Has worn inserts.  Try topical medications.  Has had physical therapy.  All without success.  Describes \"feet always hurt\".  Has seen other DPMs without success.  Post static pain. L>R.  Being off the feet does not seem to help.  Works at [employer].  Mostly stands all day.  PVR studies were done just recently.  Gabapentin for the past few weeks doesn't seem to help.   Ibuprofen seems to help when he takes it.   Is a smoker.    Review of Systems   Constitutional: Negative.    HENT: Negative.     Eyes: Negative.    Respiratory: Negative.     Cardiovascular: Negative.    Gastrointestinal: Negative.    Endocrine: Negative.    Genitourinary: Negative.    Musculoskeletal:  Positive for arthralgias and gait problem.   Allergic/Immunologic: Negative.    Hematological: Negative.    Psychiatric/Behavioral: Negative.         General/Constitutional: Alert. NAD.   Respiratory: Non labored breathing.   Psychiatric: Mood and affect normal/baseline.   HEENT: Sclera clear. Wearing corrective lenses.  Dermatologic: Nails are dystrophic. No acute inflammatory infectious process. Web spaces are dry. No ulcers no pre-ulcerative areas.  Vascular: Pedal pulses are intact and symmetric including the dorsalis pedis and the posterior tibial pulses. Feet are warm to touch. No swelling appreciated either extremity.  Neurological: Alert and oriented. No acute distress.  Sensation intact  Musculoskeletal: Strength is normal for age. No acute deficits appreciated.  Major muscle groups are intact.  No deficits noted.  Pes planus deformity noted.  Likely midfoot DJD.  Perhaps mild " plantar discomfort on palpation.  No sinus tarsi pain at this time though has had this before.  PVR studies: Normal findings.  CRP normal.  Rheumatoid factor normal.  X-rays demonstrate midfoot DJD right greater than left.  Also first MTP joint DJD    -Today's treatment and course of therapy was discussed with the patient in detail. Patient's questions were answered. Proper foot care was discussed. This dictation was done using Dragon computer software and as such may contain grammatical errors.    -Labs and x-rays reviewed with patient.    -Will order physical therapy.    -Prescribed Medrol Dosepak.    -Can consider new orthotic devices.    -Prescribe compound topical pain medication this will include recent formula diclofenac.    -Discussed smoking cessation.

## 2024-03-28 DIAGNOSIS — R79.89 LOW TESTOSTERONE: ICD-10-CM

## 2024-03-28 RX ORDER — TESTOSTERONE CYPIONATE 200 MG/ML
200 INJECTION, SOLUTION INTRAMUSCULAR
Qty: 30 ML | Refills: 3 | Status: CANCELLED | OUTPATIENT
Start: 2024-03-28

## 2024-04-03 ENCOUNTER — EVALUATION (OUTPATIENT)
Dept: PHYSICAL THERAPY | Facility: CLINIC | Age: 54
End: 2024-04-03
Payer: COMMERCIAL

## 2024-04-03 DIAGNOSIS — M79.671 PAIN IN BOTH FEET: ICD-10-CM

## 2024-04-03 DIAGNOSIS — M79.672 PAIN IN BOTH FEET: ICD-10-CM

## 2024-04-03 DIAGNOSIS — M72.2 PLANTAR FASCIITIS: ICD-10-CM

## 2024-04-03 PROCEDURE — 97161 PT EVAL LOW COMPLEX 20 MIN: CPT | Mod: GP

## 2024-04-03 RX ORDER — TESTOSTERONE CYPIONATE 200 MG/ML
200 INJECTION, SOLUTION INTRAMUSCULAR
Qty: 10 ML | Refills: 0 | Status: CANCELLED | OUTPATIENT
Start: 2024-04-03

## 2024-04-03 ASSESSMENT — PATIENT HEALTH QUESTIONNAIRE - PHQ9
1. LITTLE INTEREST OR PLEASURE IN DOING THINGS: NEARLY EVERY DAY
2. FEELING DOWN, DEPRESSED OR HOPELESS: NEARLY EVERY DAY
SUM OF ALL RESPONSES TO PHQ9 QUESTIONS 1 AND 2: 6

## 2024-04-03 NOTE — PROGRESS NOTES
Physical Therapy    Physical Therapy Evaluation    Patient Name: Jose Luis Paulson  MRN: [id number]  Today's Date: 04/03/24  Time Calculation  Start Time: 0245  Stop Time: 0310  Time Calculation (min): 25 min    Therapy Diagnoses:    1. Pain in both feet  Referral to Physical Therapy    Follow Up In Physical Therapy      2. Plantar fasciitis  Referral to Physical Therapy          Visit #: 1     Insurance:   Payor: ANTH / Plan: ANTHEM HMP / Product Type: *No Product type* /   BILAT PLANTAR FASCIITIS  $30 COPAY   60 PT/OT/ST V PCY 0 USED   NO AUTH NEEDED  Referring Physician: Isaak Reynolds DPM    Assessment:     Assessment limited due to late arrival.    Jose Luis Paulson is a 53 y.o. year old male who participated in a physical therapy evaluation today due to complaint of bilateral foot pain R>L. Familiar pain unable to be provoked in session and appears to be catastrophized due to multiple stressors. Patient reports that pain is worst when first standing after prolonged immobility such as car rides or sleeping, but is always most severe when at work. Pain location was unable to be pinpointed through palpation or through patient report. It appears to be diffusely present through the plantar surface of the feet. In stance, the patient demoes severe bilateral pronation due to muscle weakness seen in evaluation. Overall, his impairments include Pain, Active ROM, Passive ROM, Strength, Balance, Activity limitations, Flexibility, Edema, Motor function/control, Joint mobility, Posture, Decreased functional level, and Aerobic capacity/endurance. Due to these impairments the patient is unable to perform Lifting, Walking, Stair negotiation, Working, and Standing. Jose Luis will benefit from continued skilled physical therapy as well as development of a home exercise program to address current impairments and progress towards return to their prior level of function without limitations.    Additional: When questioned, patient reported  feeling very unhappy. He reported high stress due to work, finances, and family life. Patient was agreeable to seeking mental health and was provided resources for mental health care.     Rehab Potential: fair    Clinical Presentation:  Stable and/or uncomplicated characteristics    Level of Complexity: Low    Plan:     Recommended Treatment:  Therapeutic exercise, Manual therapy, Gait training, Home program instruction and progression, Neuromuscular re-education, Therapeutic activities, Self care and home management, Instruction in activity modification, Vasopneumatic device + cold, Cryotherapy, and Dry Needling    Recommended Visits: 1-2 visits x 4-6 weeks     Patient in agreement with POC.       Subjective:  Reason For Visit: Initial Evaluation  - CC: Patient arrives with complaint of bilateral foot pain - unable to pinpoint location  - DOI: 04/01/2021  - DOS: none  - PONCHO: insidious  - IMAGING: mild degenerative changes of bilateral feet  - HX: Attempted PT 1 year ago without improvement; has seen 3 different foot doctors per patient; has attempted multiple medications  - PAIN: CURRENT: (0/10); BEST: (0/10); WORST: (5/10); LOCATION: (bilateral feet); Description: ache  - ALLEVIATES PAIN: ibuprofen, rest  - EXACERBATES PAIN: first movement after prolonged inactivity; only has pain first thing in morning and when at work  - CURRENT MEDICAL MANAGEMENT: none  - PLOF: Patient reports no functional limitations prior to injury.   - FUNCTIONAL LIMITATIONS: Walking, Working, and Standing  - LIVING ENVIRONMENT: Lives with grandson and daughter   - WORK: [employer] - long hours on feet  - EXERCISE: none  - PATIENT'S GOAL: reduce pain       Precautions:  Fall Risk: None  PMH: HBP       Objective:   Other Measures  Lower Extremity Funtional Score (LEFS): 52    - ANKLE/FOOT AROM (supine):   DF R/L: neutral bilaterally  PF R/L: 50 / 55   INV R/L: 50 / 45  Ev R/L: 10 / 15  Great toe ext R/L: WNL bilaterally     -MANUAL MUSCLE TEST  "(supine):  DF R/L: 4/4  PF R/L: 4+/4+   DF Inv R/L:  4+/ 4  PF Inv R/L: 4+/ 4  DF Ev R/L: 5/5  PF Ev R/L: 4/4 *pain on R     -SPECIAL TESTING  Windlass Test: Neg  Lumbar AROM: severely limited in extension - no provocation of symptoms with AROM     Observation/posture: Severe pronation present bilaterally    Palpation: Single point tenderness in R foot between 2 and 3rd ray; palpable non-painful mass present over longitudinal arch of R foot      Goals:  -LEFS: 54/80 to indicate a significant improvement in overall function.    -Patient will demo 4/5 hip strength (all joints/planes) to allow for correct gait and stair mechanics   -Patient will demo mild to no limitation in AROM of []  to allow for correct mechanics with functional mobility.  -Patient to demonstrate gait with least restrictive device for all ADLS and does not demonstrate significant deviations that may contribute to discomfort in the future  -Patient to negotiate stairs reciprocally and descend with near equal eccentric control with one rail.   -NBOS EO: 30\"  -NBOS EC: 30\"  -NBOS Airex EO: 30\"  -NBOS Airex EC: 30\"  -SLS on affected LE to be 80% of unaffected LE  -TUG without device = 15 seconds or less indicating reduced fall risk  -5 x sit to stand < 15 seconds indicating reduced fall risk      Treatment:   1) Initial evaluation - Low complexity (20 minutes)   2) Patient educated on POC, HEP, and current condition.   3) Therapeutic exercise performed (5 minutes)  4) HEP Provided (See Below)     Access Code: [email or web address]  URL: [email or web address]  Date: 04/03/2024  Prepared by: Anant Bowman    Exercises  - Seated Figure 4 Ankle Inversion with Resistance  - 1 x daily - 7 x weekly - 3 sets - 12 reps      "

## 2024-04-17 ENCOUNTER — TREATMENT (OUTPATIENT)
Dept: PHYSICAL THERAPY | Facility: CLINIC | Age: 54
End: 2024-04-17
Payer: COMMERCIAL

## 2024-04-17 DIAGNOSIS — M79.671 PAIN IN BOTH FEET: ICD-10-CM

## 2024-04-17 DIAGNOSIS — M79.672 PAIN IN BOTH FEET: ICD-10-CM

## 2024-04-17 PROCEDURE — 97110 THERAPEUTIC EXERCISES: CPT | Mod: GP

## 2024-04-17 NOTE — PROGRESS NOTES
Physical Therapy Treatment     Patient Name: Jose Luis Paulson  MRN: [id number]  Today's Date: 04/17/24  Time Calculation  Start Time: 0147  Stop Time: 0230  Time Calculation (min): 43 min    Visit # 2    INSURANCE  Payor: GABRIEL / Plan: GAB[employer]EL HMP / Product Type: *No Product type* /   [employer] F[employer]IS  $30 COPAY   60 PT/OT/ST V PCY 0 USED   NO AUTH NEEDED  Referring Physician: Isaak Reynolds DPM    CURRENT PROBLEM  1. Pain in both feet  Follow Up In Physical Therapy          ASSESSMENT    Patient presented without improvement, stating that he has not yet attempted HEP. Pain unchanged. Continues to demo severe pronation bilaterally in stance. Progressed through instruction of gastroc stretch, soleus stretch, and 4 way ankle to improved strength. Required mod verbal cueing and tactile cueing to complete session with correct form. Today's session was tolerated well without any increase in pain or irritation. Prior to leaving, importance of HEP performance was stressed with patient. He is progressing towards their goals as evidenced by improved understanding. At this time, the patient would continue to benefit from skilled PT to address remaining functional, objective and subjective deficits to allow them to return to their prior level of function.       PLAN  Progress ankle/foot strength as tolerated with focus on improved standing posture once appropriate.   Consider adding foot doming and heel raise isometric next session.   Consider IASTM next session.     SUBJECTIVE  General: Patient arrived to PT reporting no improvement. Has not yet attempted HEP.     Pain:   Intensity: 7 /10 L > R   Location: plantar surface  Description: dull to sharp     HEP Update:   Compliant: no; 0 days per week; was only provided single exercise at evaluation    Precautions:   Fall Risk: none  PMH: HBP      OBJECTIVE  PROM DF: WNL   AROM DF: no neutral bilaterally   Severe pronation bilaterally in stance    TREATMENT    Therapeutic  "Exercise:     43 minutes  NuStep level 5 x 6 min   Gastroc wall stretch 2 x 30\" R/L  Soleus stretch on step 5\" hold x 12 R/L  Resisted figure 4 ankle for post tib blue TB 2 x 12   Resisted ankle PF + toe curl blue TB 2 x 12   Resisted ankle DF PT assisted blue TB 2 x 12   Resisted ankle ev blue TB 2 x 12     Manual Therapy:        minutes  none    Neuromuscular Re-education:     minutes  none    Modalities:        minutes  none    Gait Training:            minutes  None    EDUCATION    Education Provided: Reviewed home exercise program. Home exercise program instructed and issued. Provided verbal feedback to improve exercise technique.    HEP Access Code: [email or web address]      "

## 2024-04-24 ENCOUNTER — OFFICE VISIT (OUTPATIENT)
Dept: PRIMARY CARE | Facility: CLINIC | Age: 54
End: 2024-04-24
Payer: COMMERCIAL

## 2024-04-24 ENCOUNTER — TREATMENT (OUTPATIENT)
Dept: PHYSICAL THERAPY | Facility: CLINIC | Age: 54
End: 2024-04-24
Payer: COMMERCIAL

## 2024-04-24 VITALS
HEART RATE: 79 BPM | RESPIRATION RATE: 18 BRPM | DIASTOLIC BLOOD PRESSURE: 86 MMHG | SYSTOLIC BLOOD PRESSURE: 124 MMHG | OXYGEN SATURATION: 99 % | WEIGHT: 220 LBS | HEIGHT: 71 IN | BODY MASS INDEX: 30.8 KG/M2

## 2024-04-24 DIAGNOSIS — M79.672 PAIN IN BOTH FEET: ICD-10-CM

## 2024-04-24 DIAGNOSIS — N52.9 ERECTILE DYSFUNCTION, UNSPECIFIED ERECTILE DYSFUNCTION TYPE: ICD-10-CM

## 2024-04-24 DIAGNOSIS — I10 PRIMARY HYPERTENSION: ICD-10-CM

## 2024-04-24 DIAGNOSIS — M19.071 PRIMARY OSTEOARTHRITIS OF BOTH FEET: ICD-10-CM

## 2024-04-24 DIAGNOSIS — M19.072 PRIMARY OSTEOARTHRITIS OF BOTH FEET: ICD-10-CM

## 2024-04-24 DIAGNOSIS — M79.671 PAIN IN BOTH FEET: ICD-10-CM

## 2024-04-24 DIAGNOSIS — N50.9 DISORDER OF MALE GENITAL ORGANS: ICD-10-CM

## 2024-04-24 DIAGNOSIS — Z00.00 ANNUAL PHYSICAL EXAM: ICD-10-CM

## 2024-04-24 DIAGNOSIS — R79.89 LOW TESTOSTERONE: Primary | ICD-10-CM

## 2024-04-24 PROCEDURE — 3074F SYST BP LT 130 MM HG: CPT | Performed by: INTERNAL MEDICINE

## 2024-04-24 PROCEDURE — 99214 OFFICE O/P EST MOD 30 MIN: CPT | Performed by: INTERNAL MEDICINE

## 2024-04-24 PROCEDURE — 97110 THERAPEUTIC EXERCISES: CPT | Mod: GP

## 2024-04-24 PROCEDURE — 3079F DIAST BP 80-89 MM HG: CPT | Performed by: INTERNAL MEDICINE

## 2024-04-24 RX ORDER — MELOXICAM 7.5 MG/1
7.5 TABLET ORAL DAILY
Qty: 30 TABLET | Refills: 11 | Status: SHIPPED | OUTPATIENT
Start: 2024-04-24 | End: 2025-04-24

## 2024-04-24 RX ORDER — TESTOSTERONE CYPIONATE 200 MG/ML
INJECTION, SOLUTION INTRAMUSCULAR
Qty: 2 ML | Refills: 0 | OUTPATIENT
Start: 2024-04-24

## 2024-04-24 RX ORDER — TESTOSTERONE CYPIONATE 200 MG/ML
200 INJECTION, SOLUTION INTRAMUSCULAR
Qty: 1 ML | Refills: 1 | Status: SHIPPED | OUTPATIENT
Start: 2024-04-24 | End: 2024-05-30

## 2024-04-24 ASSESSMENT — PAIN SCALES - GENERAL: PAINLEVEL: 6

## 2024-04-24 NOTE — PROGRESS NOTES
Physical Therapy Treatment     Patient Name: Jose Luis Paulson  MRN: [id number]  Today's Date: 04/24/24  Time Calculation  Start Time: 0150  Stop Time: 0230  Time Calculation (min): 40 min    Visit # 3    INSURANCE  Payor: GABRIEL / Plan: GABRIEL HMP / Product Type: *No Product type* /   [employer] FASCIITIS  $30 COPAY   60 PT/OT/ST V PCY 0 USED   NO AUTH NEEDED  Referring Physician: Isaak Reynolds DPM    CURRENT PROBLEM  1. Pain in both feet  Follow Up In Physical Therapy            ASSESSMENT    Patient presented with improvement in pain frequency. Pain unchanged in intensity after long day of work on his feet. Continues to demo severe pronation bilaterally in stance. Progressed through instruction of arch doming and toe curls to improve intrinsic foot musculature. Also able to progress 4 way ankle to black TB improved ankle strength. Required min verbal cueing and tactile cueing to complete doming with correct form and was unable to coordinate toe curls effectively, so not yet added to HEP. Today's session was tolerated well without any increase in pain or irritation. Prior to leaving, importance of HEP performance was stressed with patient. He is progressing towards their goals as evidenced by improved understanding. At this time, the patient would continue to benefit from skilled PT to address remaining functional, objective and subjective deficits to allow them to return to their prior level of function.       PLAN  Progress ankle/foot strength as tolerated with focus on improved standing posture once appropriate.   Consider IASTM next session.     SUBJECTIVE  General: Patient arrived to PT reporting better compliance with HEP and improvement in pain frequency. Continues to have pain of similar intensity following long day on his feet. Was provided arthritis meloxicam from MD today, has not yet taken.     Pain:   Intensity: 7 /10 L > R   Location: plantar surface  Description: dull to sharp     HEP Update:  "  Compliant: no; 0 days per week; was only provided single exercise at evaluation    Precautions:   Fall Risk: none  PMH: HBP; Hx of testicular cancer      OBJECTIVE  PROM DF: WNL   AROM DF: to neutral bilaterally   Severe pronation bilaterally in stance    TREATMENT    Therapeutic Exercise:     40 minutes  NuStep level 5 x 6 min   Arch doming 3\" hold 2 x 12 R/L   [place] curls 2 x 20 curls R/L *unable to progress towel  Resisted figure 4 ankle for post tib black TB 2 x 12   Resisted ankle PF + toe curl black TB 2 x 12   Resisted ankle DF PT assisted black TB 2 x 12   Resisted ankle ev black TB 2 x 12     not performed this date:  Gastroc wall stretch 2 x 30\" R/L  Soleus stretch on step 5\" hold x 12 R/L    Manual Therapy:        minutes  none    Neuromuscular Re-education:     minutes  none    Modalities:        minutes  none    Gait Training:            minutes  None    EDUCATION    Education Provided: Reviewed home exercise program. Home exercise program instructed and issued. Provided verbal feedback to improve exercise technique.    HEP Access Code: [email or web address]      "

## 2024-04-24 NOTE — PROGRESS NOTES
"Subjective   Jose Luis Paulson is a 53 y.o. male who presents for Follow-up.  Patient presents for follow-up of low testosterone.  He has low testosterone due to orchiectomy to treat testicular cancer.  He was being monitored and prescribed testosterone by his urologist.  However, his urologist changed locations and patient does not want to drive out to see him.  He was referred to a new urologist who deferred management to his previous urologist.  He has been out of his testosterone for the past week.  Risks of testosterone reviewed with patient at length including VTE and atherosclerosis.  Patient advised that he can get a short prescription of testosterone but will need to see a specialist to continue this medication.  Patient complains of right foot pain.  She had recent foot x-rays do not show any issues.  The area is consistent with a lipoma on exam.  Patient complains of bilateral foot pain related to work.  He works at [employer].  He is on his feet all day.  He was given steroids by podiatry which helped but were short-lived.        Objective     /86 (BP Location: Left arm, Patient Position: Sitting, BP Cuff Size: Adult)   Pulse 79   Resp 18   Ht 1.803 m (5' 11\")   Wt 99.8 kg (220 lb)   SpO2 99%   BMI 30.68 kg/m²      Physical Exam  Constitutional:       Appearance: Normal appearance.   HENT:      Head: Normocephalic and atraumatic.      Nose: Nose normal.      Mouth/Throat:      Mouth: Mucous membranes are moist.      Pharynx: Oropharynx is clear.   Eyes:      Extraocular Movements: Extraocular movements intact.      Pupils: Pupils are equal, round, and reactive to light.   Cardiovascular:      Rate and Rhythm: Normal rate and regular rhythm.   Pulmonary:      Effort: No respiratory distress.      Breath sounds: Normal breath sounds. No wheezing, rhonchi or rales.   Abdominal:      General: Bowel sounds are normal. There is no distension.      Palpations: Abdomen is soft.      Tenderness: There is no " abdominal tenderness. There is no guarding.   Musculoskeletal:      Right lower leg: No edema.      Left lower leg: No edema.   Skin:     General: Skin is warm and dry.   Neurological:      Mental Status: He is alert and oriented to person, place, and time. Mental status is at baseline.   Psychiatric:         Mood and Affect: Mood normal.         Behavior: Behavior normal.         Assessment/Plan   Problem List Items Addressed This Visit       Erectile dysfunction     As needed Viagra         Hypertension     Controlled  Continue current medications         Low testosterone - Primary    Relevant Medications    testosterone cypionate (Depo-Testosterone) 200 mg/mL injection    Disorder of male genital organs     Refill testosterone            Other Visit Diagnoses       Primary osteoarthritis of both feet        Relevant Medications    meloxicam (Mobic) 7.5 mg tablet    Annual physical exam        Relevant Orders    CBC    Comprehensive Metabolic Panel    Hemoglobin A1C    Lipid Panel    Prostate Specific Antigen    Thyroid Stimulating Hormone    Vitamin D 25-Hydroxy,Total (for eval of Vitamin D levels)          Patient requires testosterone therapy due to orchiectomy for testicular cancer  Patient was referred back to his previous urologist by his most recent urologist  Patient does not want to drive across [facility] for his appointment, referred to endocrinology  Discussed at length with patient that he will likely need to reestablish with his previous urologist, especially if endocrinology will not continue his testosterone therapy  Return in 6 months for a physical       Hai Sanchez DO

## 2024-04-25 PROBLEM — H92.02 LEFT EAR PAIN: Status: RESOLVED | Noted: 2024-03-19 | Resolved: 2024-04-25

## 2024-04-25 PROBLEM — R94.128 ABNORMAL TYMPANOGRAM: Status: RESOLVED | Noted: 2023-04-20 | Resolved: 2024-04-25

## 2024-04-25 PROBLEM — R74.8 ABNORMAL LIVER ENZYMES: Status: RESOLVED | Noted: 2021-03-23 | Resolved: 2024-04-25

## 2024-04-25 PROBLEM — J01.00 ACUTE MAXILLARY SINUSITIS: Status: RESOLVED | Noted: 2024-03-19 | Resolved: 2024-04-25

## 2024-04-25 PROBLEM — M21.40 PES PLANUS: Status: RESOLVED | Noted: 2024-03-19 | Resolved: 2024-04-25

## 2024-04-25 PROBLEM — L30.1: Status: RESOLVED | Noted: 2020-07-26 | Resolved: 2024-04-25

## 2024-04-25 PROBLEM — R45.89 DEPRESSED MOOD: Status: RESOLVED | Noted: 2023-04-20 | Resolved: 2024-04-25

## 2024-04-25 PROBLEM — R09.81 NASAL CONGESTION: Status: RESOLVED | Noted: 2024-03-19 | Resolved: 2024-04-25

## 2024-04-25 PROBLEM — N50.89 TESTICULAR MASS: Status: RESOLVED | Noted: 2023-04-20 | Resolved: 2024-04-25

## 2024-04-25 PROBLEM — R40.0 DAYTIME SLEEPINESS: Status: RESOLVED | Noted: 2023-04-20 | Resolved: 2024-04-25

## 2024-04-25 PROBLEM — E29.1 TESTICULAR HYPOFUNCTION: Status: RESOLVED | Noted: 2021-07-04 | Resolved: 2024-04-25

## 2024-04-25 PROBLEM — R14.0 ABDOMINAL BLOATING: Status: RESOLVED | Noted: 2024-03-19 | Resolved: 2024-04-25

## 2024-05-01 ENCOUNTER — OFFICE VISIT (OUTPATIENT)
Dept: PODIATRY | Facility: CLINIC | Age: 54
End: 2024-05-01
Payer: COMMERCIAL

## 2024-05-01 ENCOUNTER — TREATMENT (OUTPATIENT)
Dept: PHYSICAL THERAPY | Facility: CLINIC | Age: 54
End: 2024-05-01
Payer: COMMERCIAL

## 2024-05-01 DIAGNOSIS — M79.672 PAIN IN BOTH FEET: Primary | ICD-10-CM

## 2024-05-01 DIAGNOSIS — M25.571 SINUS TARSI SYNDROME OF BOTH ANKLES: ICD-10-CM

## 2024-05-01 DIAGNOSIS — R26.89 ANTALGIC GAIT: ICD-10-CM

## 2024-05-01 DIAGNOSIS — M21.42 PES PLANUS OF BOTH FEET: ICD-10-CM

## 2024-05-01 DIAGNOSIS — M79.671 PAIN IN BOTH FEET: Primary | ICD-10-CM

## 2024-05-01 DIAGNOSIS — R26.81 GAIT INSTABILITY: ICD-10-CM

## 2024-05-01 DIAGNOSIS — M21.41 PES PLANUS OF BOTH FEET: ICD-10-CM

## 2024-05-01 DIAGNOSIS — M72.2 PLANTAR FASCIITIS: ICD-10-CM

## 2024-05-01 DIAGNOSIS — M25.572 SINUS TARSI SYNDROME OF BOTH ANKLES: ICD-10-CM

## 2024-05-01 PROCEDURE — 99214 OFFICE O/P EST MOD 30 MIN: CPT | Performed by: PODIATRIST

## 2024-05-01 PROCEDURE — 97110 THERAPEUTIC EXERCISES: CPT | Mod: GP

## 2024-05-01 ASSESSMENT — ENCOUNTER SYMPTOMS
RESPIRATORY NEGATIVE: 1
CARDIOVASCULAR NEGATIVE: 1
ALLERGIC/IMMUNOLOGIC NEGATIVE: 1
GASTROINTESTINAL NEGATIVE: 1
CONSTITUTIONAL NEGATIVE: 1
EYES NEGATIVE: 1
ARTHRALGIAS: 1
ENDOCRINE NEGATIVE: 1
PSYCHIATRIC NEGATIVE: 1
HEMATOLOGIC/LYMPHATIC NEGATIVE: 1

## 2024-05-01 NOTE — PROGRESS NOTES
Physical Therapy Treatment     Patient Name: Jose Luis Paulson  MRN: [id number]  Today's Date: 05/01/24  Time Calculation  Start Time: 0230  Stop Time: 0312  Time Calculation (min): 42 min    Visit # 4    INSURANCE  Payor: GABRIEL / Plan: GABRIEL HMP / Product Type: *No Product type* /   B[employer]AT [employer]AR FASCIITIS  $30 COPAY   60 PT/OT/ST V PCY 0 USED   NO AUTH NEEDED  Referring Physician: Isaak Reynolds DPM    CURRENT PROBLEM  1. Pain in both feet  Follow Up In Physical Therapy          ASSESSMENT    Patient presented with improvement in pain frequency. However, he has not worked in 5 days allowing feet to rest. Continues to demo severe pronation bilaterally in stance. Reviewed arch doming and toe curls to improve intrinsic foot musculature and able to progress through addition of 3 way heel raise and SLS. SLS stance very effective at discouraging pronated position. Required only min verbal cueing and tactile cueing to complete doming with correct form and was unable to coordinate toe curls/arching more effectively than last session. Today's session was tolerated well without any increase in pain or irritation. Prior to leaving, importance of HEP performance was stressed with patient. He is progressing towards their goals as evidenced by improved understanding. At this time, the patient would continue to benefit from skilled PT to address remaining functional, objective and subjective deficits to allow them to return to their prior level of function.       PLAN  Progress ankle/foot strength as tolerated with focus on improved standing posture once appropriate.   Consider IASTM vs dry needling next session if pain returns.    SUBJECTIVE  General: Patient arrived to PT reporting no pain over last few days. States that he has been off work last 5 days and able to rest feet.     Pain:   Intensity: 0/10  Location: plantar surface  Description: dull to sharp     HEP Update:   Compliant: no; Performed 3 days per week; was only  "provided single exercise at evaluation    Precautions:   Fall Risk: none  PMH: HBP; Hx of testicular cancer      OBJECTIVE  PROM DF: WNL   AROM DF: to neutral bilaterally   Severe pronation bilaterally in stance    TREATMENT    Therapeutic Exercise:     42 minutes  NuStep level 5 x 6 min   Arch doming 3\" hold 2 x 12 R/L   [place] curls 2 x 20 curls R/L *slow progression of pillow case  3 way DL heel raise from deficit 2 x 10 ea. *demos poor height with toe out position  SLS on Airex 5 x 10\" R/L   Resisted figure 4 ankle for post tib black TB 2 x 12     not performed this date:  Gastroc wall stretch 2 x 30\" R/L  Soleus stretch on step 5\" hold x 12 R/L  Resisted ankle PF + toe curl black TB 2 x 12   Resisted ankle DF PT assisted black TB 2 x 12   Resisted ankle ev black TB 2 x 12     Manual Therapy:        minutes  none    Neuromuscular Re-education:     minutes  none    Modalities:        minutes  none    Gait Training:            minutes  None    EDUCATION    Education Provided: Reviewed home exercise program. Home exercise program instructed and issued. Provided verbal feedback to improve exercise technique.    HEP Access Code: [email or web address]      "

## 2024-05-01 NOTE — PROGRESS NOTES
Chief Complaint   Patient presents with    Follow-up     Patient is follow up on foot pain CARRIE. Is currently in PT every week and PCP gave RX for Meloxicam.  Medrol dose pack did help for a week, but pain came back     Patient is doing physical therapy.  He does think it is helping.  Medrol helped initially.    Noted a small fibroma plantar aspect of the right foot.  Patient was unaware of this.  It is asymptomatic.    Feels that the left foot is a bit more painful than the right foot.  Again difficult to describe the areas of pain.  At times he can move the ankle.  At times can be in the foot itself.  He has been using topical medication though it does not seem as if it is helping.  Works at [employer].  Mostly stands all day.    He is a smoker.    Review of Systems   Constitutional: Negative.    HENT: Negative.     Eyes: Negative.    Respiratory: Negative.     Cardiovascular: Negative.    Gastrointestinal: Negative.    Endocrine: Negative.    Genitourinary: Negative.    Musculoskeletal:  Positive for arthralgias and gait problem.   Allergic/Immunologic: Negative.    Hematological: Negative.    Psychiatric/Behavioral: Negative.         General/Constitutional: Alert. NAD.   Respiratory: Non labored breathing.   Psychiatric: Mood and affect normal/baseline.   HEENT: Sclera clear. Wearing corrective lenses.  Dermatologic: Stable dystrophic nails.  No acute inflammatory infectious process. Web spaces are dry. No ulcers no pre-ulcerative areas.  Vascular: Pedal pulses are intact and symmetric including the dorsalis pedis and the posterior tibial pulses. Feet are warm to touch. No swelling appreciated either extremity.  Neurological: Alert and oriented. No acute distress.  Sensation intact.  Alex Zahraa monofilament testing normal.  Musculoskeletal: Strength is normal for age. No acute deficits appreciated.  Major muscle groups are intact.  No deficits noted.  Pes planus deformity noted.  Again strength is very normal.   No frontal plane range of motion.  No midfoot or hindfoot range of motion pain noted.  No sinus tarsi pain.  Small plantar fibroma central mid arch of the right foot.  Asymptomatic.  PVR studies: Normal findings.  CRP normal.  Rheumatoid factor normal.  X-rays demonstrate midfoot DJD right greater than left.  Also first MTP joint DJD    Impression: Bilateral diffuse foot pain antalgic gait.  Planta fasciitis.  Plantar fibroma right foot.    -Today's treatment and course of therapy was discussed with the patient in detail. Patient's questions were answered. Proper foot care was discussed. This dictation was done using Dragon computer software and as such may contain grammatical errors.    -Will check with insurance regarding prior authorization for orthotics.    -Continue since you have at the compound topical pain medication this will include recent formula diclofenac.    -Finish your course of physical therapy.    -Again encourage/discussed smoking cessation.    -Will monitor plantar fibroma.  Consider MR imaging as needed.

## 2024-05-02 DIAGNOSIS — E78.5 HYPERLIPIDEMIA, UNSPECIFIED HYPERLIPIDEMIA TYPE: ICD-10-CM

## 2024-05-02 RX ORDER — ATORVASTATIN CALCIUM 10 MG/1
10 TABLET, FILM COATED ORAL DAILY
Qty: 90 TABLET | Refills: 3 | Status: SHIPPED | OUTPATIENT
Start: 2024-05-02

## 2024-05-08 ENCOUNTER — APPOINTMENT (OUTPATIENT)
Dept: PHYSICAL THERAPY | Facility: CLINIC | Age: 54
End: 2024-05-08
Payer: COMMERCIAL

## 2024-05-27 DIAGNOSIS — R40.0 DAYTIME SLEEPINESS: ICD-10-CM

## 2024-05-28 RX ORDER — ERGOCALCIFEROL 1.25 MG/1
1 CAPSULE ORAL
Qty: 12 CAPSULE | Refills: 0 | Status: SHIPPED | OUTPATIENT
Start: 2024-06-02

## 2024-05-29 ENCOUNTER — TELEPHONE (OUTPATIENT)
Dept: PRIMARY CARE | Facility: CLINIC | Age: 54
End: 2024-05-29
Payer: COMMERCIAL

## 2024-05-29 DIAGNOSIS — Z12.5 PROSTATE CANCER SCREENING: ICD-10-CM

## 2024-05-29 DIAGNOSIS — K21.9 GASTROESOPHAGEAL REFLUX DISEASE WITHOUT ESOPHAGITIS: ICD-10-CM

## 2024-05-29 DIAGNOSIS — I10 PRIMARY HYPERTENSION: ICD-10-CM

## 2024-05-29 DIAGNOSIS — R79.89 LOW TESTOSTERONE: ICD-10-CM

## 2024-05-29 NOTE — TELEPHONE ENCOUNTER
Patient came to office.  Stated he needed prescription for Testosterone.  Went to see Dr. Maciej Randolph today.  Dr. Randolph would not give him prescription.  Told patient I would talk to Dr. Sanchez.  Dr. Sanchez stated that he would give him one month prescription for testosterone and patient should go back to original urologist that preformed surgery to get prescription.  Called patient and informed him.  Patient aware he is to contact original urologist in the future.

## 2024-05-30 RX ORDER — METOPROLOL SUCCINATE 25 MG/1
25 TABLET, EXTENDED RELEASE ORAL DAILY
Qty: 90 TABLET | Refills: 3 | Status: SHIPPED | OUTPATIENT
Start: 2024-05-30

## 2024-05-30 RX ORDER — METOPROLOL SUCCINATE 25 MG/1
25 TABLET, EXTENDED RELEASE ORAL DAILY
Qty: 90 TABLET | Refills: 0 | Status: SHIPPED | OUTPATIENT
Start: 2024-05-30

## 2024-05-30 RX ORDER — PANTOPRAZOLE SODIUM 40 MG/1
TABLET, DELAYED RELEASE ORAL
Qty: 90 TABLET | Refills: 0 | Status: SHIPPED | OUTPATIENT
Start: 2024-05-30

## 2024-05-30 RX ORDER — TESTOSTERONE CYPIONATE 200 MG/ML
INJECTION, SOLUTION INTRAMUSCULAR
Qty: 1 ML | Refills: 0 | Status: SHIPPED | OUTPATIENT
Start: 2024-05-30

## 2024-05-30 RX ORDER — PANTOPRAZOLE SODIUM 40 MG/1
40 TABLET, DELAYED RELEASE ORAL DAILY
Qty: 90 TABLET | Refills: 3 | Status: SHIPPED | OUTPATIENT
Start: 2024-05-30

## 2024-06-05 ENCOUNTER — APPOINTMENT (OUTPATIENT)
Dept: PHYSICAL THERAPY | Facility: CLINIC | Age: 54
End: 2024-06-05
Payer: COMMERCIAL

## 2024-07-03 ENCOUNTER — DOCUMENTATION WITH CHARGES (OUTPATIENT)
Dept: PHYSICAL THERAPY | Facility: CLINIC | Age: 54
End: 2024-07-03
Payer: COMMERCIAL

## 2024-07-03 NOTE — PROGRESS NOTES
Physical Therapy Discharge    Patient Name: Jose Luis Paulson  MRN: [id number]  Today's Date: 7/3/2024     Reason for discharge: Failure to follow up. Canceled last scheduled appt.     Action: Patient will be discharged at this time.

## 2024-07-05 DIAGNOSIS — R79.89 LOW TESTOSTERONE: ICD-10-CM

## 2024-07-05 DIAGNOSIS — I10 PRIMARY HYPERTENSION: ICD-10-CM

## 2024-07-08 DIAGNOSIS — I10 PRIMARY HYPERTENSION: ICD-10-CM

## 2024-07-08 RX ORDER — TESTOSTERONE CYPIONATE 200 MG/ML
INJECTION, SOLUTION INTRAMUSCULAR
Qty: 1 ML | Refills: 0 | Status: SHIPPED | OUTPATIENT
Start: 2024-07-08

## 2024-07-08 RX ORDER — AMLODIPINE BESYLATE 2.5 MG/1
2.5 TABLET ORAL DAILY
Qty: 90 TABLET | Refills: 3 | Status: SHIPPED | OUTPATIENT
Start: 2024-07-08

## 2024-07-08 RX ORDER — AMLODIPINE BESYLATE 2.5 MG/1
2.5 TABLET ORAL DAILY
Qty: 90 TABLET | Refills: 0 | Status: SHIPPED | OUTPATIENT
Start: 2024-07-08

## 2024-08-08 ENCOUNTER — OFFICE VISIT (OUTPATIENT)
Dept: UROLOGY | Facility: HOSPITAL | Age: 54
End: 2024-08-08
Payer: COMMERCIAL

## 2024-08-08 ENCOUNTER — LAB (OUTPATIENT)
Dept: LAB | Facility: LAB | Age: 54
End: 2024-08-08
Payer: COMMERCIAL

## 2024-08-08 DIAGNOSIS — R79.89 LOW TESTOSTERONE: ICD-10-CM

## 2024-08-08 DIAGNOSIS — C62.91 MALIGNANT NEOPLASM OF RIGHT TESTIS, UNSPECIFIED WHETHER DESCENDED OR UNDESCENDED (MULTI): ICD-10-CM

## 2024-08-08 DIAGNOSIS — R79.89 LOW TESTOSTERONE: Primary | ICD-10-CM

## 2024-08-08 DIAGNOSIS — Z12.5 PROSTATE CANCER SCREENING: ICD-10-CM

## 2024-08-08 DIAGNOSIS — E29.1 HYPOGONADISM MALE: ICD-10-CM

## 2024-08-08 DIAGNOSIS — Z00.00 ANNUAL PHYSICAL EXAM: ICD-10-CM

## 2024-08-08 LAB
25(OH)D3 SERPL-MCNC: 42 NG/ML (ref 30–100)
ALBUMIN SERPL BCP-MCNC: 4.5 G/DL (ref 3.4–5)
ALP SERPL-CCNC: 92 U/L (ref 33–120)
ALT SERPL W P-5'-P-CCNC: 41 U/L (ref 10–52)
ANION GAP SERPL CALC-SCNC: 12 MMOL/L (ref 10–20)
AST SERPL W P-5'-P-CCNC: 31 U/L (ref 9–39)
BILIRUB SERPL-MCNC: 0.4 MG/DL (ref 0–1.2)
BUN SERPL-MCNC: 15 MG/DL (ref 6–23)
CALCIUM SERPL-MCNC: 9.2 MG/DL (ref 8.6–10.3)
CHLORIDE SERPL-SCNC: 104 MMOL/L (ref 98–107)
CHOLEST SERPL-MCNC: 166 MG/DL (ref 0–199)
CHOLESTEROL/HDL RATIO: 4
CO2 SERPL-SCNC: 27 MMOL/L (ref 21–32)
CREAT SERPL-MCNC: 1.05 MG/DL (ref 0.5–1.3)
EGFRCR SERPLBLD CKD-EPI 2021: 84 ML/MIN/1.73M*2
ERYTHROCYTE [DISTWIDTH] IN BLOOD BY AUTOMATED COUNT: 12.4 % (ref 11.5–14.5)
EST. AVERAGE GLUCOSE BLD GHB EST-MCNC: 105 MG/DL
GLUCOSE SERPL-MCNC: 103 MG/DL (ref 74–99)
HBA1C MFR BLD: 5.3 %
HCT VFR BLD AUTO: 47.8 % (ref 41–52)
HDLC SERPL-MCNC: 41.1 MG/DL
HGB BLD-MCNC: 16.5 G/DL (ref 13.5–17.5)
LDLC SERPL CALC-MCNC: 62 MG/DL
LH SERPL-ACNC: 5.2 IU/L
MCH RBC QN AUTO: 34.1 PG (ref 26–34)
MCHC RBC AUTO-ENTMCNC: 34.5 G/DL (ref 32–36)
MCV RBC AUTO: 99 FL (ref 80–100)
NON HDL CHOLESTEROL: 125 MG/DL (ref 0–149)
NRBC BLD-RTO: 0 /100 WBCS (ref 0–0)
PLATELET # BLD AUTO: 163 X10*3/UL (ref 150–450)
POTASSIUM SERPL-SCNC: 4 MMOL/L (ref 3.5–5.3)
PROLACTIN SERPL-MCNC: 3.3 UG/L (ref 2–18)
PROT SERPL-MCNC: 7.1 G/DL (ref 6.4–8.2)
PSA SERPL-MCNC: 0.83 NG/ML
RBC # BLD AUTO: 4.84 X10*6/UL (ref 4.5–5.9)
SODIUM SERPL-SCNC: 139 MMOL/L (ref 136–145)
TESTOST SERPL-MCNC: 233 NG/DL (ref 240–1000)
TRIGL SERPL-MCNC: 314 MG/DL (ref 0–149)
TSH SERPL-ACNC: 0.83 MIU/L (ref 0.44–3.98)
VLDL: 63 MG/DL (ref 0–40)
WBC # BLD AUTO: 7.4 X10*3/UL (ref 4.4–11.3)

## 2024-08-08 PROCEDURE — 99214 OFFICE O/P EST MOD 30 MIN: CPT | Performed by: UROLOGY

## 2024-08-08 PROCEDURE — 80061 LIPID PANEL: CPT

## 2024-08-08 PROCEDURE — 85027 COMPLETE CBC AUTOMATED: CPT

## 2024-08-08 PROCEDURE — 80053 COMPREHEN METABOLIC PANEL: CPT

## 2024-08-08 PROCEDURE — 84443 ASSAY THYROID STIM HORMONE: CPT

## 2024-08-08 RX ORDER — TESTOSTERONE CYPIONATE 200 MG/ML
INJECTION, SOLUTION INTRAMUSCULAR
Qty: 2 ML | Refills: 5 | Status: SHIPPED | OUTPATIENT
Start: 2024-08-08

## 2024-08-08 RX ORDER — SYRINGE WITH NEEDLE, 1 ML 25GX5/8"
SYRINGE, EMPTY DISPOSABLE MISCELLANEOUS
Qty: 2 EACH | Refills: 5 | Status: SHIPPED | OUTPATIENT
Start: 2024-08-08

## 2024-08-08 NOTE — PROGRESS NOTES
FUV    Last seen - 9/9/24     HISTORY OF PRESENT ILLNESS:   Jose Luis Paulson is a 53 y.o. male who is being seen today for fuv    S/p right radical orchiectomy with placement of testicular implant on 03/29/22. 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 on AS with Dr. Estes     Was on T cyp in past, has been off for some time    PDE5i for ED    PAST MEDICAL HISTORY:  No past medical history on file.    PAST SURGICAL HISTORY:  Past Surgical History:   Procedure Laterality Date    OTHER SURGICAL HISTORY  09/08/2021    Umbilical hernia repair        ALLERGIES:   No Known Allergies     MEDICATIONS:   Current Outpatient Medications   Medication Instructions    amLODIPine (NORVASC) 2.5 mg, oral, Daily    amLODIPine (NORVASC) 2.5 mg, oral, Daily    atorvastatin (LIPITOR) 10 mg, oral, Daily    bacitracin 500 unit/gram ointment Topical, 2 times daily    ergocalciferol (VITAMIN D-2) 1,250 mcg, oral, Once Weekly    gabapentin (NEURONTIN) 400 mg, oral, 3 times daily     mg tablet TAKE ONE TABLET BY MOUTH EVERY 6 TO 8 HOURS AS NEEDED    meloxicam (MOBIC) 7.5 mg, oral, Daily    metoprolol succinate XL (TOPROL-XL) 25 mg, oral, Daily    metoprolol succinate XL (TOPROL-XL) 25 mg, oral, Daily    miscellaneous medical supply (Blood Pressure Cuff) misc Check BP daily and record a log    pantoprazole (ProtoNix) 40 mg EC tablet TAKE ONE TABLET(40 MG)BY MOUTH ONCE DAILY    pantoprazole (PROTONIX) 40 mg, oral, Daily    sildenafil (VIAGRA) 100 mg, oral, As needed    testosterone cypionate (Depo-Testosterone) 200 mg/mL injection INJECT 1 ML INTO THE MUSCLE EVERY 14 DAYS        PHYSICAL EXAM:  There were no vitals taken for this visit.  Constitutional: Patient appears well-developed and well-nourished. No distress.    Pulmonary/Chest: Effort normal. No respiratory distress.   Musculoskeletal: Normal range of motion.    Neurological: Alert and oriented to person, place, and time.  Psychiatric:  "Normal mood and affect. Behavior is normal. Thought content normal.      Labs  Lab Results   Component Value Date    TESTOSTERONE 259 07/01/2023    TESTOSTERONE 196 (L) 11/24/2021    TESTOSTERONE 109 (L) 07/08/2021     Lab Results   Component Value Date    ESTRADIOL 26 07/01/2023     Lab Results   Component Value Date    PSA 0.63 01/10/2024     Lab Results   Component Value Date    GFRMALE >90 07/01/2023     Lab Results   Component Value Date    CREATININE 0.89 01/10/2024     Lab Results   Component Value Date    CHOL 129 01/10/2024     Lab Results   Component Value Date    HDL 35.0 01/10/2024     Lab Results   Component Value Date    CHHDL 3.7 01/10/2024     Lab Results   Component Value Date    LDLF 67 09/16/2022     Lab Results   Component Value Date    VLDL 31 01/10/2024     Lab Results   Component Value Date    TRIG 154 (H) 01/10/2024     Lab Results   Component Value Date    HGBA1C 5.2 04/22/2023     No components found for: \"TESTOTMS\"  Lab Results   Component Value Date    TESTF 172.8 (H) 09/16/2022     No results found for: \"17HYDROXYPRO\"  Lab Results   Component Value Date    HCT 46.7 01/10/2024       Imaging    Procedures      Assessment:      1. Low testosterone        2. Malignant neoplasm of right testis, unspecified whether descended or undescended (Multi)            Jose Luis Paulson is a 53 y.o. male here for FUV     Plan:   1)  Labs today  2) Refill T Cyp    Testosterone replacement therapy  The patient's lab work and clinical symptoms suggest that he has hypogonadism or testosterone deficiency.  I had a long discussion with the patient about the treatment options of his condition. This included lifestyle modification as well as supplementation.   Exogenous testosterone as well as the use of selective estrogen receptor modulators (SERMs), aromatase inhibitors, human gonadotropins were discussed.  The several treatment options including various formulations were discussed. I explained the risks, benefits, " mechanisms, and use of each therapy.  I described potential side effects of treatment. Common side effects include acne, erythrocytosis, breast tenderness, and changes in behavior.  I highlighted the effect of exogenous testosterone on the reproductive system, including decrease in testicular volume and the negative affect on sperm production which may result in sterility, which could be permanent.    We discussed that current data suggest that external testosterone therapy does not cause an increase in the risk of developing prostate cancer, and can also be used safely in patients after prostate cancer treatment.  It may however cause an increase in prostate volume and PSA.  We discussed that the there is controversy in replacement therapies effect on the cardiovascular system.     We also discussed the precautions necessary to keep the medication from contacting others, especially women and children, for whom contact with the medication can be particularly harmful.   He will use soap and water to wash his hands after application and will clean his skin before having skin-to-skin contact with others if he chooses gel application delivery of testosterone.  A monitoring schedule was discussed with includes routine evaluation of serum testosterone, hematocrit, lipid panel, and PSA.  No barriers to learning were identified.  After all of the patient's questions were satisfactorily answered, he expressed understanding of the risks of therapy.    I have personally reviewed the OARRS report for this patient. This report is scanned into the electronic medical record. I have considered the  risks of abuse, dependence, addiction and diversion.    Controlled substance agreement was completed in the office.

## 2024-08-29 ENCOUNTER — APPOINTMENT (OUTPATIENT)
Dept: UROLOGY | Facility: HOSPITAL | Age: 54
End: 2024-08-29
Payer: COMMERCIAL

## 2024-10-23 ENCOUNTER — APPOINTMENT (OUTPATIENT)
Dept: PRIMARY CARE | Facility: CLINIC | Age: 54
End: 2024-10-23
Payer: COMMERCIAL

## 2024-10-31 ENCOUNTER — APPOINTMENT (OUTPATIENT)
Dept: PRIMARY CARE | Facility: CLINIC | Age: 54
End: 2024-10-31
Payer: COMMERCIAL

## 2024-12-04 ENCOUNTER — APPOINTMENT (OUTPATIENT)
Dept: PODIATRY | Facility: CLINIC | Age: 54
End: 2024-12-04
Payer: COMMERCIAL

## 2025-01-23 ENCOUNTER — APPOINTMENT (OUTPATIENT)
Dept: RADIOLOGY | Facility: HOSPITAL | Age: 55
End: 2025-01-23
Payer: COMMERCIAL

## 2025-01-23 ENCOUNTER — HOSPITAL ENCOUNTER (EMERGENCY)
Facility: HOSPITAL | Age: 55
Discharge: HOME | End: 2025-01-23
Payer: COMMERCIAL

## 2025-01-23 ENCOUNTER — DOCUMENTATION (OUTPATIENT)
Dept: SURGERY | Facility: CLINIC | Age: 55
End: 2025-01-23
Payer: COMMERCIAL

## 2025-01-23 VITALS
DIASTOLIC BLOOD PRESSURE: 86 MMHG | BODY MASS INDEX: 30.88 KG/M2 | HEIGHT: 72 IN | TEMPERATURE: 97.3 F | OXYGEN SATURATION: 96 % | HEART RATE: 78 BPM | WEIGHT: 228 LBS | SYSTOLIC BLOOD PRESSURE: 150 MMHG | RESPIRATION RATE: 16 BRPM

## 2025-01-23 DIAGNOSIS — R11.0 NAUSEA: ICD-10-CM

## 2025-01-23 DIAGNOSIS — K82.8 THICKENING OF WALL OF GALLBLADDER WITH PERICHOLECYSTIC FLUID: Primary | ICD-10-CM

## 2025-01-23 DIAGNOSIS — R10.84 GENERALIZED ABDOMINAL PAIN: Primary | ICD-10-CM

## 2025-01-23 DIAGNOSIS — R10.11 RIGHT UPPER QUADRANT PAIN: ICD-10-CM

## 2025-01-23 LAB
ALBUMIN SERPL BCP-MCNC: 4.4 G/DL (ref 3.4–5)
ALP SERPL-CCNC: 85 U/L (ref 33–120)
ALT SERPL W P-5'-P-CCNC: 50 U/L (ref 10–52)
ANION GAP SERPL CALC-SCNC: 12 MMOL/L (ref 10–20)
APPEARANCE UR: CLEAR
AST SERPL W P-5'-P-CCNC: 37 U/L (ref 9–39)
BASOPHILS # BLD AUTO: 0.04 X10*3/UL (ref 0–0.1)
BASOPHILS NFR BLD AUTO: 0.4 %
BILIRUB SERPL-MCNC: 0.6 MG/DL (ref 0–1.2)
BILIRUB UR STRIP.AUTO-MCNC: NEGATIVE MG/DL
BUN SERPL-MCNC: 8 MG/DL (ref 6–23)
CALCIUM SERPL-MCNC: 9.4 MG/DL (ref 8.6–10.3)
CHLORIDE SERPL-SCNC: 102 MMOL/L (ref 98–107)
CO2 SERPL-SCNC: 27 MMOL/L (ref 21–32)
COLOR UR: ABNORMAL
CREAT SERPL-MCNC: 0.99 MG/DL (ref 0.5–1.3)
EGFRCR SERPLBLD CKD-EPI 2021: 89 ML/MIN/1.73M*2
EOSINOPHIL # BLD AUTO: 0.08 X10*3/UL (ref 0–0.7)
EOSINOPHIL NFR BLD AUTO: 0.8 %
ERYTHROCYTE [DISTWIDTH] IN BLOOD BY AUTOMATED COUNT: 11.7 % (ref 11.5–14.5)
GLUCOSE SERPL-MCNC: 137 MG/DL (ref 74–99)
GLUCOSE UR STRIP.AUTO-MCNC: NORMAL MG/DL
HCT VFR BLD AUTO: 50.2 % (ref 41–52)
HGB BLD-MCNC: 17.6 G/DL (ref 13.5–17.5)
IMM GRANULOCYTES # BLD AUTO: 0.03 X10*3/UL (ref 0–0.7)
IMM GRANULOCYTES NFR BLD AUTO: 0.3 % (ref 0–0.9)
KETONES UR STRIP.AUTO-MCNC: NEGATIVE MG/DL
LACTATE SERPL-SCNC: 0.9 MMOL/L (ref 0.4–2)
LEUKOCYTE ESTERASE UR QL STRIP.AUTO: NEGATIVE
LIPASE SERPL-CCNC: 74 U/L (ref 9–82)
LYMPHOCYTES # BLD AUTO: 1.83 X10*3/UL (ref 1.2–4.8)
LYMPHOCYTES NFR BLD AUTO: 18.4 %
MCH RBC QN AUTO: 33.8 PG (ref 26–34)
MCHC RBC AUTO-ENTMCNC: 35.1 G/DL (ref 32–36)
MCV RBC AUTO: 97 FL (ref 80–100)
MONOCYTES # BLD AUTO: 0.95 X10*3/UL (ref 0.1–1)
MONOCYTES NFR BLD AUTO: 9.5 %
NEUTROPHILS # BLD AUTO: 7.03 X10*3/UL (ref 1.2–7.7)
NEUTROPHILS NFR BLD AUTO: 70.6 %
NITRITE UR QL STRIP.AUTO: NEGATIVE
NRBC BLD-RTO: 0 /100 WBCS (ref 0–0)
PH UR STRIP.AUTO: 7.5 [PH]
PLATELET # BLD AUTO: 149 X10*3/UL (ref 150–450)
POTASSIUM SERPL-SCNC: 3.9 MMOL/L (ref 3.5–5.3)
PROT SERPL-MCNC: 7.5 G/DL (ref 6.4–8.2)
PROT UR STRIP.AUTO-MCNC: NEGATIVE MG/DL
RBC # BLD AUTO: 5.2 X10*6/UL (ref 4.5–5.9)
RBC # UR STRIP.AUTO: NEGATIVE /UL
SODIUM SERPL-SCNC: 137 MMOL/L (ref 136–145)
SP GR UR STRIP.AUTO: >1.05
UROBILINOGEN UR STRIP.AUTO-MCNC: NORMAL MG/DL
WBC # BLD AUTO: 10 X10*3/UL (ref 4.4–11.3)

## 2025-01-23 PROCEDURE — 81003 URINALYSIS AUTO W/O SCOPE: CPT | Performed by: NURSE PRACTITIONER

## 2025-01-23 PROCEDURE — 83605 ASSAY OF LACTIC ACID: CPT | Performed by: NURSE PRACTITIONER

## 2025-01-23 PROCEDURE — 2500000004 HC RX 250 GENERAL PHARMACY W/ HCPCS (ALT 636 FOR OP/ED): Performed by: NURSE PRACTITIONER

## 2025-01-23 PROCEDURE — 99285 EMERGENCY DEPT VISIT HI MDM: CPT | Mod: 25

## 2025-01-23 PROCEDURE — 99221 1ST HOSP IP/OBS SF/LOW 40: CPT | Performed by: SURGERY

## 2025-01-23 PROCEDURE — 74177 CT ABD & PELVIS W/CONTRAST: CPT

## 2025-01-23 PROCEDURE — 96374 THER/PROPH/DIAG INJ IV PUSH: CPT | Mod: 59

## 2025-01-23 PROCEDURE — 85025 COMPLETE CBC W/AUTO DIFF WBC: CPT | Performed by: NURSE PRACTITIONER

## 2025-01-23 PROCEDURE — 36415 COLL VENOUS BLD VENIPUNCTURE: CPT | Performed by: NURSE PRACTITIONER

## 2025-01-23 PROCEDURE — 74177 CT ABD & PELVIS W/CONTRAST: CPT | Performed by: RADIOLOGY

## 2025-01-23 PROCEDURE — 83690 ASSAY OF LIPASE: CPT | Performed by: NURSE PRACTITIONER

## 2025-01-23 PROCEDURE — 76705 ECHO EXAM OF ABDOMEN: CPT

## 2025-01-23 PROCEDURE — 96375 TX/PRO/DX INJ NEW DRUG ADDON: CPT

## 2025-01-23 PROCEDURE — 76705 ECHO EXAM OF ABDOMEN: CPT | Performed by: RADIOLOGY

## 2025-01-23 PROCEDURE — 80053 COMPREHEN METABOLIC PANEL: CPT | Performed by: NURSE PRACTITIONER

## 2025-01-23 PROCEDURE — 2550000001 HC RX 255 CONTRASTS: Performed by: NURSE PRACTITIONER

## 2025-01-23 RX ORDER — MORPHINE SULFATE 4 MG/ML
4 INJECTION, SOLUTION INTRAMUSCULAR; INTRAVENOUS ONCE
Status: COMPLETED | OUTPATIENT
Start: 2025-01-23 | End: 2025-01-23

## 2025-01-23 RX ORDER — ACETAMINOPHEN 500 MG
1000 TABLET ORAL EVERY 6 HOURS PRN
COMMUNITY

## 2025-01-23 RX ORDER — ONDANSETRON 4 MG/1
4 TABLET, ORALLY DISINTEGRATING ORAL EVERY 8 HOURS PRN
Qty: 15 TABLET | Refills: 0 | Status: SHIPPED | OUTPATIENT
Start: 2025-01-23 | End: 2025-01-28

## 2025-01-23 RX ORDER — KETOROLAC TROMETHAMINE 30 MG/ML
30 INJECTION, SOLUTION INTRAMUSCULAR; INTRAVENOUS ONCE
Status: COMPLETED | OUTPATIENT
Start: 2025-01-23 | End: 2025-01-23

## 2025-01-23 RX ORDER — IBUPROFEN 600 MG/1
600 TABLET ORAL EVERY 6 HOURS PRN
Qty: 28 TABLET | Refills: 0 | Status: SHIPPED | OUTPATIENT
Start: 2025-01-23 | End: 2025-01-30

## 2025-01-23 RX ORDER — ONDANSETRON HYDROCHLORIDE 2 MG/ML
4 INJECTION, SOLUTION INTRAVENOUS ONCE
Status: COMPLETED | OUTPATIENT
Start: 2025-01-23 | End: 2025-01-23

## 2025-01-23 RX ADMIN — MORPHINE SULFATE 4 MG: 4 INJECTION, SOLUTION INTRAMUSCULAR; INTRAVENOUS at 11:04

## 2025-01-23 RX ADMIN — IOHEXOL 75 ML: 350 INJECTION, SOLUTION INTRAVENOUS at 13:02

## 2025-01-23 RX ADMIN — ONDANSETRON 4 MG: 2 INJECTION INTRAMUSCULAR; INTRAVENOUS at 11:04

## 2025-01-23 RX ADMIN — KETOROLAC TROMETHAMINE 30 MG: 30 INJECTION, SOLUTION INTRAMUSCULAR at 16:44

## 2025-01-23 ASSESSMENT — LIFESTYLE VARIABLES
TOTAL SCORE: 0
EVER FELT BAD OR GUILTY ABOUT YOUR DRINKING: NO
HAVE YOU EVER FELT YOU SHOULD CUT DOWN ON YOUR DRINKING: NO
EVER HAD A DRINK FIRST THING IN THE MORNING TO STEADY YOUR NERVES TO GET RID OF A HANGOVER: NO
HAVE PEOPLE ANNOYED YOU BY CRITICIZING YOUR DRINKING: NO

## 2025-01-23 ASSESSMENT — COLUMBIA-SUICIDE SEVERITY RATING SCALE - C-SSRS
1. IN THE PAST MONTH, HAVE YOU WISHED YOU WERE DEAD OR WISHED YOU COULD GO TO SLEEP AND NOT WAKE UP?: NO
6. HAVE YOU EVER DONE ANYTHING, STARTED TO DO ANYTHING, OR PREPARED TO DO ANYTHING TO END YOUR LIFE?: NO
2. HAVE YOU ACTUALLY HAD ANY THOUGHTS OF KILLING YOURSELF?: NO

## 2025-01-23 NOTE — ED TRIAGE NOTES
"Patient arrives from home with complaints of abdominal bloating, diarrhea. States he has been trying to make himself vomit to relieve pressure denies nausea. Patient complains of generalized body ache and :just doesn't feel good\"  "

## 2025-01-23 NOTE — PROGRESS NOTES
"Pharmacy Medication History Review    Jose Luis Paulson is a 54 y.o. male admitted for No Principal Problem: There is no principal problem currently on the Problem List. Please update the Problem List and refresh.. Pharmacy reviewed the patient's [id number] medications and allergies for accuracy.    The list below reflectives the updated PTA list. Please review each medication in order reconciliation for additional clarification and justification.  Prior to Admission medications    Medication Sig Start Date End Date Taking? Authorizing Provider   acetaminophen (Tylenol) 500 mg tablet Take 2 tablets (1,000 mg) by mouth every 6 hours if needed for mild pain (1 - 3).   Yes Historical Provider, MD   amLODIPine (Norvasc) 2.5 mg tablet TAKE ONE TABLET BY MOUTH EVERY DAY  Patient taking differently: Take 1 tablet (2.5 mg) by mouth once daily at bedtime. 7/8/24  Yes Hai Sanchez DO   atorvastatin (Lipitor) 10 mg tablet TAKE ONE TABLET BY MOUTH EVERY DAY  Patient taking differently: Take 1 tablet (10 mg) by mouth once daily at bedtime. 5/2/24  Yes Hai Sanchez DO   bacitracin 500 unit/gram ointment Apply topically 2 times a day. 6/1/23  Yes Hai Sanchez DO   metoprolol succinate XL (Toprol-XL) 25 mg 24 hr tablet Take 1 tablet (25 mg) by mouth once daily.  Patient taking differently: Take 1 tablet (25 mg) by mouth once daily at bedtime. 5/30/24  Yes Hai Sanchez DO   miscellaneous medical supply (Blood Pressure Cuff) misc Check BP daily and record a log 6/1/23  Yes Hai Sanchez DO   needle, disp, 18 G 18 gauge x 1 1/2\" needle Use for medication draw 8/8/24  Yes Tani Interiano MD   pantoprazole (ProtoNix) 40 mg EC tablet TAKE ONE TABLET(40 MG)BY MOUTH ONCE DAILY 5/30/24  Yes Hai Sanchez DO   pantoprazole (ProtoNix) 40 mg EC tablet Take 1 tablet (40 mg) by mouth once daily.  Patient taking differently: Take 1 tablet (40 mg) by mouth once daily at bedtime. 5/30/24  Yes " "Hai Sanchez DO   syringe with needle, safety (BD Safety-Regi Detachable Needl) 3 mL 22 gauge x 1\" syringe USE TO INJECT IM TESTOSTERONE EVERY 2 WEEKS 8/8/24  Yes Tani Interiano MD   testosterone cypionate (Depo-Testosterone) 200 mg/mL injection Inject 1cc IM  every 2 weeks  Patient taking differently: Inject 0.5 mL (100 mg) into the muscle every 14 (fourteen) days. 8/8/24  Yes Tani Interiano MD   amLODIPine (Norvasc) 2.5 mg tablet Take 1 tablet (2.5 mg) by mouth once daily. 7/8/24  duplicate Hai Sanchez DO   ergocalciferol (Vitamin D-2) 1.25 MG (40776 UT) capsule TAKE ONE CAPSULE BY MOUTH ONCE A WEEK  Patient not taking: Reported on 1/23/2025 6/2/24  no Hai Sanchez DO   gabapentin (Neurontin) 400 mg capsule Take 1 capsule (400 mg) by mouth 3 times a day.  Patient not taking: Reported on 1/23/2025 1/24/24 7/22/24 no Hai Sanchez DO    mg tablet TAKE ONE TABLET BY MOUTH EVERY 6 TO 8 HOURS AS NEEDED  Patient not taking: Reported on 1/23/2025 5/19/23  no Historical Provider, MD   meloxicam (Mobic) 7.5 mg tablet Take 1 tablet (7.5 mg) by mouth once daily.  Patient not taking: Reported on 1/23/2025 4/24/24 4/24/25 no Hai Sanchez DO   metoprolol succinate XL (Toprol-XL) 25 mg 24 hr tablet TAKE ONE TABLET BY MOUTH EVERY DAY 5/30/24  duplicate Hai Sanchez DO   sildenafil (Viagra) 100 mg tablet Take 1 tablet (100 mg) by mouth if needed.   yes Historical Provider, MD        The list below reflectives the updated allergy list. Please review each documented allergy for additional clarification and justification.  Allergies  Reviewed by Helena Adrian RN on 1/23/2025   No Known Allergies         Below are additional concerns with the patient's PTA list.      Daniela Pina    "

## 2025-01-23 NOTE — Clinical Note
Jose Luis Paulson was seen and treated in our emergency department on 1/23/2025.  He may return to work on 01/26/2025.       If you have any questions or concerns, please don't hesitate to call.      Holden Murphy, APRN-CNP

## 2025-01-23 NOTE — DISCHARGE INSTRUCTIONS
You have been diagnosed with gallstones.  The general surgeon did come to evaluate you in the emergency department and believes you will be safe for discharge home.  If significant pain, fever, nausea, vomiting, inability to eat or drink to occur, please return to the emergency department immediately for reevaluation.

## 2025-01-23 NOTE — ED PROVIDER NOTES
THIS IS MY JONATHAN SUPERVISORY AND SHARED VISIT NOTE:    I personally saw the patient and made/approved the management plan and take responsibility for the patient management.    History: ***    Exam: ***    MDM: Patient seen and evaluated at bedside, patient is in no acute distress.  I will order a *** . Differential diagnosis includes but is not limited to ***.    Please see JONATHAN note for further details    Sections of this report were created using voice-to-text technology and may contain errors in translation    Darion Jones,   Emergency Medicine     sludge and focal gallbladder wall thickening and   pericholecystic fluid. However no ultrasonic Hassan sign. Findings   are equivocal for cholecystitis. Clinical correlation and follow-up   are warranted.        MACRO:   None        Signed by: Kath Vera 1/23/2025 3:31 PM   Dictation workstation:   [id number]      CT abdomen pelvis w IV contrast   Final Result   Recommend right upper quadrant ultrasound today to further assess the   gallbladder. It is borderline dilated, surrounded by new inflammatory   fat stranding, and the luminal contents are not uniform in CT   attenuation, suggesting at least sludge if not stones. The   gallbladder is probably partially contracted at its mid segment,   trapping sludge in the fundus and distally. Focal form of   adenomyomatosis may be present at the tip of the fundus        No other acute findings        The liver is mildly enlarged and fatty but not cirrhotic, unchanged        No acute pancreatitis, hydronephrosis/urinary stone or any other   acute solid organ findings        Normal appendix        No acute diverticulitis or other colitis        No bowel obstruction, perforation, abscess or free fluid        MACRO:   None        Signed by: Anshul Frank 1/23/2025 1:33 PM   Dictation workstation:   [id number]        Results for orders placed or performed during the hospital encounter of 01/23/25   Lactate    Collection Time: 01/23/25 11:05 AM   Result Value Ref Range    Lactate 0.9 0.4 - 2.0 mmol/L   Lipase    Collection Time: 01/23/25 11:05 AM   Result Value Ref Range    Lipase 74 9 - 82 U/L   Comprehensive Metabolic Panel    Collection Time: 01/23/25 11:05 AM   Result Value Ref Range    Glucose 137 (H) 74 - 99 mg/dL    Sodium 137 136 - 145 mmol/L    Potassium 3.9 3.5 - 5.3 mmol/L    Chloride 102 98 - 107 mmol/L    Bicarbonate 27 21 - 32 mmol/L    Anion Gap 12 10 - 20 mmol/L    Urea Nitrogen 8 6 - 23 mg/dL    Creatinine 0.99 0.50 - 1.30 mg/dL    eGFR 89 >60 mL/min/1.73m*2     Calcium 9.4 8.6 - 10.3 mg/dL    Albumin 4.4 3.4 - 5.0 g/dL    Alkaline Phosphatase 85 33 - 120 U/L    Total Protein 7.5 6.4 - 8.2 g/dL    AST 37 9 - 39 U/L    Bilirubin, Total 0.6 0.0 - 1.2 mg/dL    ALT 50 10 - 52 U/L   CBC and Auto Differential    Collection Time: 01/23/25 11:05 AM   Result Value Ref Range    WBC 10.0 4.4 - 11.3 x10*3/uL    nRBC 0.0 0.0 - 0.0 /100 WBCs    RBC 5.20 4.50 - 5.90 x10*6/uL    Hemoglobin 17.6 (H) 13.5 - 17.5 g/dL    Hematocrit 50.2 41.0 - 52.0 %    MCV 97 80 - 100 fL    MCH 33.8 26.0 - 34.0 pg    MCHC 35.1 32.0 - 36.0 g/dL    RDW 11.7 11.5 - 14.5 %    Platelets 149 (L) 150 - 450 x10*3/uL    Neutrophils % 70.6 40.0 - 80.0 %    Immature Granulocytes %, Automated 0.3 0.0 - 0.9 %    Lymphocytes % 18.4 13.0 - 44.0 %    Monocytes % 9.5 2.0 - 10.0 %    Eosinophils % 0.8 0.0 - 6.0 %    Basophils % 0.4 0.0 - 2.0 %    Neutrophils Absolute 7.03 1.20 - 7.70 x10*3/uL    Immature Granulocytes Absolute, Automated 0.03 0.00 - 0.70 x10*3/uL    Lymphocytes Absolute 1.83 1.20 - 4.80 x10*3/uL    Monocytes Absolute 0.95 0.10 - 1.00 x10*3/uL    Eosinophils Absolute 0.08 0.00 - 0.70 x10*3/uL    Basophils Absolute 0.04 0.00 - 0.10 x10*3/uL   Urinalysis with Reflex Culture and Microscopic    Collection Time: 01/23/25  2:14 PM   Result Value Ref Range    Color, Urine Light-Yellow Light-Yellow, Yellow, Dark-Yellow    Appearance, Urine Clear Clear    Specific Gravity, Urine >1.050 (N) 1.005 - 1.035    pH, Urine 7.5 5.0, 5.5, 6.0, 6.5, 7.0, 7.5, 8.0    Protein, Urine NEGATIVE NEGATIVE, 10 (TRACE), 20 (TRACE) mg/dL    Glucose, Urine Normal Normal mg/dL    Blood, Urine NEGATIVE NEGATIVE    Ketones, Urine NEGATIVE NEGATIVE mg/dL    Bilirubin, Urine NEGATIVE NEGATIVE    Urobilinogen, Urine Normal Normal mg/dL    Nitrite, Urine NEGATIVE NEGATIVE    Leukocyte Esterase, Urine NEGATIVE NEGATIVE   Extra Urine Gray Tube    Collection Time: 01/23/25  2:14 PM   Result Value Ref Range    Extra Tube Hold for add-ons.           Please see JONATHAN note for further details    Sections of this report were created using voice-to-text technology and may contain errors in translation    Darion Jones DO  Emergency Medicine       Darion Jones DO  01/24/25 1529

## 2025-01-23 NOTE — ED PROVIDER NOTES
"HPI   Chief Complaint   Patient presents with    Abdominal Pain     Patient arrives from home with complaints of abdominal bloating, diarrhea. States he has been trying to make himself vomit to relieve pressure denies nausea. Patient complains of generalized body ache and :just doesn't feel good\"       Patient is a 54-year-old male with past med history of hernia repair who presents ED today due to nausea, nonbilious nonbloody vomiting, and diarrhea.  Patient states symptoms are about 3 days ago.  Patient states he has been eating and drinking however has become more painful.  He states he has been taking Pepto-Bismol but feels very bloated.  He denies any fevers or chills.  He denies any urinary symptoms.  Patient denies any chest pain or shortness of breath.      History provided by:  Patient   used: No            Patient History   No past medical history on file.  Past Surgical History:   Procedure Laterality Date    OTHER SURGICAL HISTORY  09/08/2021    Umbilical hernia repair     No family history on file.  Social History     Tobacco Use    Smoking status: Every Day     Current packs/day: 1.00     Average packs/day: 1 pack/day for 15.0 years (15.0 ttl pk-yrs)     Types: Cigarettes    Smokeless tobacco: Never   Substance Use Topics    Alcohol use: Never    Drug use: Never       Physical Exam   ED Triage Vitals [01/23/25 1001]   Temperature Heart Rate Respirations BP   36.9 °C (98.4 °F) 79 18 (!) 167/98      Pulse Ox Temp Source Heart Rate Source Patient Position   98 % Temporal Monitor Sitting      BP Location FiO2 (%)     Right arm --       Physical Exam  Vitals and nursing note reviewed.   Constitutional:       General: He is not in acute distress.     Appearance: He is well-developed.   HENT:      Head: Normocephalic and atraumatic.   Eyes:      Conjunctiva/sclera: Conjunctivae normal.   Cardiovascular:      Rate and Rhythm: Normal rate and regular rhythm.      Heart sounds: No murmur " heard.  Pulmonary:      Effort: Pulmonary effort is normal. No respiratory distress.      Breath sounds: Normal breath sounds.   Abdominal:      General: Abdomen is protuberant. Bowel sounds are normal.      Palpations: Abdomen is soft.      Tenderness: There is generalized abdominal tenderness. There is no right CVA tenderness, left CVA tenderness or guarding.   Musculoskeletal:         General: No swelling.      Cervical back: Neck supple.   Skin:     General: Skin is warm and dry.      Capillary Refill: Capillary refill takes less than 2 seconds.   Neurological:      Mental Status: He is alert.   Psychiatric:         Mood and Affect: Mood normal.         ED Course & MDM   ED Course as of 01/23/25 1630   Thu Jan 23, 2025   1141 CBC and Auto Differential(!)  CBC stable, no leukocytosis, anemia, or thrombocytopenia [WS]   1254 Lactate  Not elevated [WS]   1254 Comprehensive Metabolic Panel(!)  CMP is stable, no electrolyte abnormalities, metabolic disorder, kidney injury, or elevated liver enzymes consistent with liver pathology. [WS]   1254 Lipase  Not elevated, unlikely pancreatitis [WS]   1348 CT abdomen pelvis w IV contrast  Recommend right upper quadrant ultrasound today to further assess the  gallbladder. It is borderline dilated, surrounded by new inflammatory  fat stranding, and the luminal contents are not uniform in CT  attenuation, suggesting at least sludge if not stones. The  gallbladder is probably partially contracted at its mid segment,  trapping sludge in the fundus and distally. Focal form of  adenomyomatosis may be present at the tip of the fundus      No other acute findings      The liver is mildly enlarged and fatty but not cirrhotic, unchanged      No acute pancreatitis, hydronephrosis/urinary stone or any other  acute solid organ findings      Normal appendix      No acute diverticulitis or other colitis      No bowel obstruction, perforation, abscess or free fluid   [WS]   1503 Urinalysis  with Reflex Culture and Microscopic(!)  No evidence of UTI or hematuria concerning for kidney stones. [WS]   1605 Surgery was consulted due to concern for cholecystitis on CT and then again on ultrasound of the gallbladder.  I did reexamine patient's abdomen he still has significant right upper quadrant tenderness.  He will be given for this and surgery will come to see him. [WS]   1606 US right upper quadrant  Echogenic fatty infiltrated liver.      Abnormal gallbladder with large calcified gallstone, likely  gallbladder sludge and focal gallbladder wall thickening and  pericholecystic fluid. However no ultrasonic Hassan sign. Findings  are equivocal for cholecystitis. Clinical correlation and follow-up  are warranted.   [WS]   1624 Spoke with Dr. Kramer, recommends [place] with follow-up Outpatient in his clinic on Tuesday 1/27/25 [WS]      ED Course User Index  [WS] Holden Murphy, APRN-CNP         Diagnoses as of 01/23/25 1630   Thickening of wall of gallbladder with pericholecystic fluid   Right upper quadrant pain   Nausea                 No data recorded     [place] Coma Scale Score: 15 (01/23/25 1018 : Mirta Harrison RN)                           Medical Decision Making    Medical Decision Making & ED Course  Medical Decision Making:  Patient is a 54-year-old male with past med history of hernia repair who presents ED today due to nausea, nonbilious nonbloody vomiting, and diarrhea.  Patient states symptoms are about 3 days ago.  Patient states he has been eating and drinking however has become more painful.  He states he has been taking Pepto-Bismol but feels very bloated.  He denies any fevers or chills.  He denies any urinary symptoms.  Patient denies any chest pain or shortness of breath.  Patient was given a dose of IV morphine and Zofran which did seem to relieve his pain initially.  He did start to have mild recurrence of pain.  Patient was then given a dose of IV Toradol which did again seem to  relieve his pain.  Patient's urinalysis was negative for UTI or hematuria concerning for kidney stone.  His lipase was not elevated.  Patient's CMP showed no electrolyte abnormality, kidney injury, or liver pathology.  Patient's lactate is normal.  Patient CBC shows no leukocytosis, anemia, or thrombocytopenia.  CT imaging of patient's abdomen pelvis reveals gallbladder that is borderline dilated with inflammatory fat stranding.  There is concern for possible sludge versus stone.  Patient does have enlarged fatty liver without cirrhosis.  No pancreatitis, hydronephrosis, urinary stone, or any other acute solid organ findings.  Appendix appeared normal.  No diverticulitis or colitis.  No bowel obstruction, perforation, abscess, or free fluid.  I did obtain ultrasound imaging which showed an echogenic fatty liver.  Gallbladder with large calcified gallstone and sludge.  Focal gallbladder wall thickening and pericholecystic fluid.  However Hassan sign was negative.  I did speak with general surgery Dr. Kramer, who came to evaluate the patient and does not believe he is having an acute cholecystitis at this time.  He would like him to follow-up in his clinic on Tuesday.  Patient patient was given medication for nausea and pain at home.  He was given strict return ED precautions including intractable abdominal pain, nausea, vomiting, or fevers.  Patient is amenable to this plan.  --  Differential diagnoses considered include but are not limited to: Intra-abdominal infection, appendicitis, cholecystitis, viral illness, gallstones, UTI, pyelonephritis, pancreatitis, kidney stones.     Social Determinants of Health which Significantly Impact Care: None identified     EKG Independent Interpretation: EKG not obtained    Independent Result Review and Interpretation: Relevant laboratory and radiographic results were reviewed and independently interpreted by myself.  As necessary, they are commented on in the ED  Course.    Chronic conditions affecting the patient's care: As documented above in [facility]    The patient was discussed with the following consultants/services: Dr. Williams Costa Considerations: As documented above in [facility]    ED Course:  ED Course as of 01/23/25 1634  ------------------------------------------------------------  Time: 01/23 1141  Value: CBC and Auto Differential(!)  Comment: CBC stable, no leukocytosis, anemia, or thrombocytopenia  By: MARIE Quevedo  ------------------------------------------------------------  Time: 01/23 1254  Value: Lactate  Comment: Not elevated  By: MARIE Quevedo  ------------------------------------------------------------  Time: 01/23 1254  Value: Comprehensive Metabolic Panel(!)  Comment: CMP is stable, no electrolyte abnormalities, metabolic disorder, kidney injury, or elevated liver enzymes consistent with liver pathology.  By: MARIE Quevedo  ------------------------------------------------------------  Time: 01/23 1254  Value: Lipase  Comment: Not elevated, unlikely pancreatitis  By: MARIE Quevedo  ------------------------------------------------------------  Time: 01/23 2498  Value: CT abdomen pelvis w IV contrast  Comment: Recommend right upper quadrant ultrasound today to further assess thegallbladder. It is borderline dilated, surrounded by new inflammatoryfat stranding, and the luminal contents are not uniform in CTattenuation, suggesting at least sludge if not stones. Thegallbladder is probably partially contracted at its mid segment,trapping sludge in the fundus and distally. Focal form ofadenomyomatosis may be present at the tip of the fundus  No other acute findings  The liver is mildly enlarged and fatty but not cirrhotic, unchanged  No acute pancreatitis, hydronephrosis/urinary stone or any otheracute solid organ findings  Normal appendix  No acute diverticulitis or other colitis  No bowel obstruction, perforation,  abscess or free fluid  By: MARIE Quevedo  ------------------------------------------------------------  Time: 01/23 1503  Value: Urinalysis with Reflex Culture and Microscopic(!)  Comment: No evidence of UTI or hematuria concerning for kidney stones.  By: MARIE Quevedo  ------------------------------------------------------------  Time: 01/23 1607  Comment: Surgery was consulted due to concern for cholecystitis on CT and then again on ultrasound of the gallbladder.  I did reexamine patient's abdomen he still has significant right upper quadrant tenderness.  He will be given for this and surgery will come to see him.  By: MARIE Quevedo  ------------------------------------------------------------  Time: 01/23 1606  Value: US right upper quadrant  Comment: Echogenic fatty infiltrated liver.  Abnormal gallbladder with large calcified gallstone, likelygallbladder sludge and focal gallbladder wall thickening andpericholecystic fluid. However no ultrasonic Hassan sign. Findingsare equivocal for cholecystitis. Clinical correlation and follow-upare warranted.  By: MARIE Quevedo  ------------------------------------------------------------  Time: 01/23 0316  Comment: Spoke with franky Denise DC with follow-up Outpatient in his clinic on Tuesday 1/27/25  By: MARIE Quevedo    ------------------------------------------------------------  Diagnoses as of 01/23/25 1634  Thickening of wall of gallbladder with pericholecystic fluid  Right upper quadrant pain  Nausea     Disposition  As a result of the work-up, the patient was discharged home.  he was informed of his diagnosis and instructed to come back with any concerns or worsening of condition.  he and was agreeable to the plan as discussed above.  he was given the opportunity to ask questions.  All of the patient's questions were answered.      This was a shared visit with an ED attending.  The patient was  seen and discussed with the ED attending    MARIE Quevedo  Emergency Medicine        Amount and/or Complexity of Data Reviewed  Labs: ordered. Decision-making details documented in ED Course.  Radiology: ordered and independent interpretation performed. Decision-making details documented in ED Course.    Risk  OTC drugs.  Prescription drug management.  Parenteral controlled substances.  Decision regarding hospitalization.        Procedure  Procedures     MARIE Quevedo  01/23/25 7040

## 2025-01-24 ENCOUNTER — APPOINTMENT (OUTPATIENT)
Dept: RADIOLOGY | Facility: HOSPITAL | Age: 55
DRG: 419 | End: 2025-01-24
Payer: COMMERCIAL

## 2025-01-24 ENCOUNTER — HOSPITAL ENCOUNTER (INPATIENT)
Facility: HOSPITAL | Age: 55
LOS: 2 days | Discharge: HOME | DRG: 419 | End: 2025-01-26
Attending: STUDENT IN AN ORGANIZED HEALTH CARE EDUCATION/TRAINING PROGRAM | Admitting: SURGERY
Payer: COMMERCIAL

## 2025-01-24 ENCOUNTER — TELEPHONE (OUTPATIENT)
Dept: GASTROENTEROLOGY | Facility: CLINIC | Age: 55
End: 2025-01-24
Payer: COMMERCIAL

## 2025-01-24 DIAGNOSIS — I10 PRIMARY HYPERTENSION: ICD-10-CM

## 2025-01-24 DIAGNOSIS — E78.5 HYPERLIPIDEMIA, UNSPECIFIED HYPERLIPIDEMIA TYPE: ICD-10-CM

## 2025-01-24 DIAGNOSIS — K81.0 ACUTE CHOLECYSTITIS: Primary | ICD-10-CM

## 2025-01-24 DIAGNOSIS — K80.00 CALCULUS OF GALLBLADDER WITH ACUTE CHOLECYSTITIS WITHOUT OBSTRUCTION: ICD-10-CM

## 2025-01-24 LAB
ALBUMIN SERPL BCP-MCNC: 4.7 G/DL (ref 3.4–5)
ALP SERPL-CCNC: 89 U/L (ref 33–120)
ALT SERPL W P-5'-P-CCNC: 43 U/L (ref 10–52)
ANION GAP SERPL CALC-SCNC: 13 MMOL/L (ref 10–20)
AST SERPL W P-5'-P-CCNC: 23 U/L (ref 9–39)
BASOPHILS # BLD AUTO: 0.04 X10*3/UL (ref 0–0.1)
BASOPHILS NFR BLD AUTO: 0.3 %
BILIRUB SERPL-MCNC: 0.7 MG/DL (ref 0–1.2)
BUN SERPL-MCNC: 10 MG/DL (ref 6–23)
CALCIUM SERPL-MCNC: 10 MG/DL (ref 8.6–10.3)
CHLORIDE SERPL-SCNC: 100 MMOL/L (ref 98–107)
CO2 SERPL-SCNC: 28 MMOL/L (ref 21–32)
CREAT SERPL-MCNC: 1.01 MG/DL (ref 0.5–1.3)
EGFRCR SERPLBLD CKD-EPI 2021: 87 ML/MIN/1.73M*2
EOSINOPHIL # BLD AUTO: 0.06 X10*3/UL (ref 0–0.7)
EOSINOPHIL NFR BLD AUTO: 0.5 %
ERYTHROCYTE [DISTWIDTH] IN BLOOD BY AUTOMATED COUNT: 11.9 % (ref 11.5–14.5)
GLUCOSE SERPL-MCNC: 177 MG/DL (ref 74–99)
HCT VFR BLD AUTO: 48.3 % (ref 41–52)
HGB BLD-MCNC: 17.2 G/DL (ref 13.5–17.5)
HOLD SPECIMEN: NORMAL
IMM GRANULOCYTES # BLD AUTO: 0.04 X10*3/UL (ref 0–0.7)
IMM GRANULOCYTES NFR BLD AUTO: 0.3 % (ref 0–0.9)
LACTATE SERPL-SCNC: 1.2 MMOL/L (ref 0.4–2)
LIPASE SERPL-CCNC: 62 U/L (ref 9–82)
LYMPHOCYTES # BLD AUTO: 2.01 X10*3/UL (ref 1.2–4.8)
LYMPHOCYTES NFR BLD AUTO: 16.6 %
MAGNESIUM SERPL-MCNC: 1.91 MG/DL (ref 1.6–2.4)
MCH RBC QN AUTO: 34.1 PG (ref 26–34)
MCHC RBC AUTO-ENTMCNC: 35.6 G/DL (ref 32–36)
MCV RBC AUTO: 96 FL (ref 80–100)
MONOCYTES # BLD AUTO: 1.01 X10*3/UL (ref 0.1–1)
MONOCYTES NFR BLD AUTO: 8.3 %
NEUTROPHILS # BLD AUTO: 8.94 X10*3/UL (ref 1.2–7.7)
NEUTROPHILS NFR BLD AUTO: 74 %
NRBC BLD-RTO: 0 /100 WBCS (ref 0–0)
PLATELET # BLD AUTO: 158 X10*3/UL (ref 150–450)
POTASSIUM SERPL-SCNC: 4.2 MMOL/L (ref 3.5–5.3)
PROT SERPL-MCNC: 7.7 G/DL (ref 6.4–8.2)
RBC # BLD AUTO: 5.04 X10*6/UL (ref 4.5–5.9)
SODIUM SERPL-SCNC: 137 MMOL/L (ref 136–145)
WBC # BLD AUTO: 12.1 X10*3/UL (ref 4.4–11.3)

## 2025-01-24 PROCEDURE — 36415 COLL VENOUS BLD VENIPUNCTURE: CPT | Performed by: PHYSICIAN ASSISTANT

## 2025-01-24 PROCEDURE — 1100000001 HC PRIVATE ROOM DAILY

## 2025-01-24 PROCEDURE — 2500000004 HC RX 250 GENERAL PHARMACY W/ HCPCS (ALT 636 FOR OP/ED): Performed by: NURSE PRACTITIONER

## 2025-01-24 PROCEDURE — 85025 COMPLETE CBC W/AUTO DIFF WBC: CPT | Performed by: PHYSICIAN ASSISTANT

## 2025-01-24 PROCEDURE — 99221 1ST HOSP IP/OBS SF/LOW 40: CPT | Performed by: REGISTERED NURSE

## 2025-01-24 PROCEDURE — 84075 ASSAY ALKALINE PHOSPHATASE: CPT | Performed by: PHYSICIAN ASSISTANT

## 2025-01-24 PROCEDURE — 83735 ASSAY OF MAGNESIUM: CPT | Performed by: PHYSICIAN ASSISTANT

## 2025-01-24 PROCEDURE — 76705 ECHO EXAM OF ABDOMEN: CPT | Mod: FOREIGN READ | Performed by: RADIOLOGY

## 2025-01-24 PROCEDURE — 2500000004 HC RX 250 GENERAL PHARMACY W/ HCPCS (ALT 636 FOR OP/ED): Performed by: REGISTERED NURSE

## 2025-01-24 PROCEDURE — 99285 EMERGENCY DEPT VISIT HI MDM: CPT | Mod: 25 | Performed by: STUDENT IN AN ORGANIZED HEALTH CARE EDUCATION/TRAINING PROGRAM

## 2025-01-24 PROCEDURE — 96374 THER/PROPH/DIAG INJ IV PUSH: CPT

## 2025-01-24 PROCEDURE — 76705 ECHO EXAM OF ABDOMEN: CPT

## 2025-01-24 PROCEDURE — 83605 ASSAY OF LACTIC ACID: CPT | Performed by: PHYSICIAN ASSISTANT

## 2025-01-24 PROCEDURE — 83690 ASSAY OF LIPASE: CPT | Performed by: PHYSICIAN ASSISTANT

## 2025-01-24 RX ORDER — HEPARIN SODIUM 5000 [USP'U]/ML
5000 INJECTION, SOLUTION INTRAVENOUS; SUBCUTANEOUS EVERY 8 HOURS
Status: DISCONTINUED | OUTPATIENT
Start: 2025-01-24 | End: 2025-01-26 | Stop reason: HOSPADM

## 2025-01-24 RX ORDER — HYDROMORPHONE HYDROCHLORIDE 1 MG/ML
1 INJECTION, SOLUTION INTRAMUSCULAR; INTRAVENOUS; SUBCUTANEOUS ONCE
Status: COMPLETED | OUTPATIENT
Start: 2025-01-24 | End: 2025-01-24

## 2025-01-24 RX ORDER — NALOXONE HYDROCHLORIDE 1 MG/ML
0.2 INJECTION INTRAMUSCULAR; INTRAVENOUS; SUBCUTANEOUS EVERY 5 MIN PRN
Status: DISCONTINUED | OUTPATIENT
Start: 2025-01-24 | End: 2025-01-25

## 2025-01-24 RX ORDER — ONDANSETRON HYDROCHLORIDE 2 MG/ML
4 INJECTION, SOLUTION INTRAVENOUS EVERY 8 HOURS PRN
Status: DISCONTINUED | OUTPATIENT
Start: 2025-01-24 | End: 2025-01-26 | Stop reason: HOSPADM

## 2025-01-24 RX ORDER — HYDRALAZINE HYDROCHLORIDE 20 MG/ML
5 INJECTION INTRAMUSCULAR; INTRAVENOUS EVERY 4 HOURS PRN
Status: DISCONTINUED | OUTPATIENT
Start: 2025-01-24 | End: 2025-01-26 | Stop reason: HOSPADM

## 2025-01-24 RX ORDER — SODIUM CHLORIDE 9 MG/ML
100 INJECTION, SOLUTION INTRAVENOUS CONTINUOUS
Status: DISCONTINUED | OUTPATIENT
Start: 2025-01-24 | End: 2025-01-26

## 2025-01-24 RX ORDER — ONDANSETRON 4 MG/1
4 TABLET, FILM COATED ORAL EVERY 8 HOURS PRN
Status: DISCONTINUED | OUTPATIENT
Start: 2025-01-24 | End: 2025-01-26 | Stop reason: HOSPADM

## 2025-01-24 RX ORDER — PANTOPRAZOLE SODIUM 40 MG/10ML
40 INJECTION, POWDER, LYOPHILIZED, FOR SOLUTION INTRAVENOUS DAILY
Status: DISCONTINUED | OUTPATIENT
Start: 2025-01-24 | End: 2025-01-26 | Stop reason: HOSPADM

## 2025-01-24 RX ORDER — HYDROMORPHONE HYDROCHLORIDE 1 MG/ML
1 INJECTION, SOLUTION INTRAMUSCULAR; INTRAVENOUS; SUBCUTANEOUS EVERY 4 HOURS PRN
Status: DISCONTINUED | OUTPATIENT
Start: 2025-01-24 | End: 2025-01-25

## 2025-01-24 RX ADMIN — HYDROMORPHONE HYDROCHLORIDE 1 MG: 1 INJECTION, SOLUTION INTRAMUSCULAR; INTRAVENOUS; SUBCUTANEOUS at 11:06

## 2025-01-24 RX ADMIN — PIPERACILLIN SODIUM AND TAZOBACTAM SODIUM 3.38 G: 3; .375 INJECTION, SOLUTION INTRAVENOUS at 14:02

## 2025-01-24 RX ADMIN — PIPERACILLIN SODIUM AND TAZOBACTAM SODIUM 3.38 G: 3; .375 INJECTION, SOLUTION INTRAVENOUS at 21:08

## 2025-01-24 RX ADMIN — PANTOPRAZOLE SODIUM 40 MG: 40 INJECTION, POWDER, FOR SOLUTION INTRAVENOUS at 15:13

## 2025-01-24 RX ADMIN — HEPARIN SODIUM 5000 UNITS: 5000 INJECTION INTRAVENOUS; SUBCUTANEOUS at 14:03

## 2025-01-24 RX ADMIN — HEPARIN SODIUM 5000 UNITS: 5000 INJECTION INTRAVENOUS; SUBCUTANEOUS at 21:09

## 2025-01-24 RX ADMIN — SODIUM CHLORIDE 100 ML/HR: 9 INJECTION, SOLUTION INTRAVENOUS at 14:47

## 2025-01-24 RX ADMIN — HYDROMORPHONE HYDROCHLORIDE 1 MG: 1 INJECTION, SOLUTION INTRAMUSCULAR; INTRAVENOUS; SUBCUTANEOUS at 18:47

## 2025-01-24 SDOH — SOCIAL STABILITY: SOCIAL INSECURITY: DOES ANYONE TRY TO KEEP YOU FROM HAVING/CONTACTING OTHER FRIENDS OR DOING THINGS OUTSIDE YOUR HOME?: NO

## 2025-01-24 SDOH — SOCIAL STABILITY: SOCIAL INSECURITY
WITHIN THE LAST YEAR, HAVE YOU BEEN KICKED, HIT, SLAPPED, OR OTHERWISE PHYSICALLY HURT BY YOUR PARTNER OR EX-PARTNER?: NO

## 2025-01-24 SDOH — ECONOMIC STABILITY: INCOME INSECURITY: IN THE PAST 12 MONTHS HAS THE ELECTRIC, GAS, OIL, OR WATER COMPANY THREATENED TO SHUT OFF SERVICES IN YOUR HOME?: NO

## 2025-01-24 SDOH — SOCIAL STABILITY: SOCIAL INSECURITY
WITHIN THE LAST YEAR, HAVE YOU BEEN RAPED OR FORCED TO HAVE ANY KIND OF SEXUAL ACTIVITY BY YOUR PARTNER OR EX-PARTNER?: NO

## 2025-01-24 SDOH — SOCIAL STABILITY: SOCIAL INSECURITY: WITHIN THE LAST YEAR, HAVE YOU BEEN AFRAID OF YOUR PARTNER OR EX-PARTNER?: NO

## 2025-01-24 SDOH — SOCIAL STABILITY: SOCIAL INSECURITY: HAVE YOU HAD THOUGHTS OF HARMING ANYONE ELSE?: NO

## 2025-01-24 SDOH — SOCIAL STABILITY: SOCIAL INSECURITY: DO YOU FEEL UNSAFE GOING BACK TO THE PLACE WHERE YOU ARE LIVING?: NO

## 2025-01-24 SDOH — SOCIAL STABILITY: SOCIAL INSECURITY: WITHIN THE LAST YEAR, HAVE YOU BEEN HUMILIATED OR EMOTIONALLY ABUSED IN OTHER WAYS BY YOUR PARTNER OR EX-PARTNER?: NO

## 2025-01-24 SDOH — SOCIAL STABILITY: SOCIAL INSECURITY: HAVE YOU HAD ANY THOUGHTS OF HARMING ANYONE ELSE?: NO

## 2025-01-24 SDOH — SOCIAL STABILITY: SOCIAL INSECURITY: WERE YOU ABLE TO COMPLETE ALL THE BEHAVIORAL HEALTH SCREENINGS?: YES

## 2025-01-24 SDOH — ECONOMIC STABILITY: FOOD INSECURITY: WITHIN THE PAST 12 MONTHS, THE FOOD YOU BOUGHT JUST DIDN'T LAST AND YOU DIDN'T HAVE MONEY TO GET MORE.: NEVER TRUE

## 2025-01-24 SDOH — SOCIAL STABILITY: SOCIAL INSECURITY: DO YOU FEEL ANYONE HAS EXPLOITED OR TAKEN ADVANTAGE OF YOU FINANCIALLY OR OF YOUR PERSONAL PROPERTY?: NO

## 2025-01-24 SDOH — SOCIAL STABILITY: SOCIAL INSECURITY: ABUSE: ADULT

## 2025-01-24 SDOH — ECONOMIC STABILITY: FOOD INSECURITY: WITHIN THE PAST 12 MONTHS, YOU WORRIED THAT YOUR FOOD WOULD RUN OUT BEFORE YOU GOT THE MONEY TO BUY MORE.: NEVER TRUE

## 2025-01-24 SDOH — SOCIAL STABILITY: SOCIAL INSECURITY: HAS ANYONE EVER THREATENED TO HURT YOUR FAMILY OR YOUR PETS?: NO

## 2025-01-24 SDOH — SOCIAL STABILITY: SOCIAL INSECURITY: ARE THERE ANY APPARENT SIGNS OF INJURIES/BEHAVIORS THAT COULD BE RELATED TO ABUSE/NEGLECT?: NO

## 2025-01-24 SDOH — SOCIAL STABILITY: SOCIAL INSECURITY: ARE YOU OR HAVE YOU BEEN THREATENED OR ABUSED PHYSICALLY, EMOTIONALLY, OR SEXUALLY BY ANYONE?: NO

## 2025-01-24 ASSESSMENT — PAIN - FUNCTIONAL ASSESSMENT
PAIN_FUNCTIONAL_ASSESSMENT: 0-10

## 2025-01-24 ASSESSMENT — ACTIVITIES OF DAILY LIVING (ADL)
TOILETING: INDEPENDENT
PATIENT'S MEMORY ADEQUATE TO SAFELY COMPLETE DAILY ACTIVITIES?: YES
DRESSING YOURSELF: INDEPENDENT
BATHING: INDEPENDENT
HEARING - LEFT EAR: FUNCTIONAL
HEARING - RIGHT EAR: FUNCTIONAL
LACK_OF_TRANSPORTATION: NO
FEEDING YOURSELF: INDEPENDENT
GROOMING: INDEPENDENT
JUDGMENT_ADEQUATE_SAFELY_COMPLETE_DAILY_ACTIVITIES: YES
ADEQUATE_TO_COMPLETE_ADL: YES
WALKS IN HOME: INDEPENDENT

## 2025-01-24 ASSESSMENT — PATIENT HEALTH QUESTIONNAIRE - PHQ9
1. LITTLE INTEREST OR PLEASURE IN DOING THINGS: NOT AT ALL
SUM OF ALL RESPONSES TO PHQ9 QUESTIONS 1 & 2: 0
2. FEELING DOWN, DEPRESSED OR HOPELESS: NOT AT ALL

## 2025-01-24 ASSESSMENT — PAIN SCALES - GENERAL
PAINLEVEL_OUTOF10: 4
PAINLEVEL_OUTOF10: 8
PAINLEVEL_OUTOF10: 10 - WORST POSSIBLE PAIN
PAINLEVEL_OUTOF10: 10 - WORST POSSIBLE PAIN

## 2025-01-24 ASSESSMENT — COGNITIVE AND FUNCTIONAL STATUS - GENERAL
DAILY ACTIVITIY SCORE: 24
MOBILITY SCORE: 24
PATIENT BASELINE BEDBOUND: NO

## 2025-01-24 ASSESSMENT — PAIN DESCRIPTION - FREQUENCY: FREQUENCY: CONSTANT/CONTINUOUS

## 2025-01-24 ASSESSMENT — PAIN DESCRIPTION - ORIENTATION: ORIENTATION: RIGHT;UPPER

## 2025-01-24 ASSESSMENT — COLUMBIA-SUICIDE SEVERITY RATING SCALE - C-SSRS
1. IN THE PAST MONTH, HAVE YOU WISHED YOU WERE DEAD OR WISHED YOU COULD GO TO SLEEP AND NOT WAKE UP?: NO
6. HAVE YOU EVER DONE ANYTHING, STARTED TO DO ANYTHING, OR PREPARED TO DO ANYTHING TO END YOUR LIFE?: NO
6. HAVE YOU EVER DONE ANYTHING, STARTED TO DO ANYTHING, OR PREPARED TO DO ANYTHING TO END YOUR LIFE?: NO
2. HAVE YOU ACTUALLY HAD ANY THOUGHTS OF KILLING YOURSELF?: NO
2. HAVE YOU ACTUALLY HAD ANY THOUGHTS OF KILLING YOURSELF?: NO
1. IN THE PAST MONTH, HAVE YOU WISHED YOU WERE DEAD OR WISHED YOU COULD GO TO SLEEP AND NOT WAKE UP?: NO

## 2025-01-24 ASSESSMENT — PAIN DESCRIPTION - LOCATION
LOCATION: ABDOMEN
LOCATION: ABDOMEN

## 2025-01-24 ASSESSMENT — LIFESTYLE VARIABLES
SUBSTANCE_ABUSE_PAST_12_MONTHS: NO
HOW OFTEN DO YOU HAVE A DRINK CONTAINING ALCOHOL: NEVER
PRESCIPTION_ABUSE_PAST_12_MONTHS: NO
SKIP TO QUESTIONS 9-10: 1
AUDIT-C TOTAL SCORE: 0
HOW MANY STANDARD DRINKS CONTAINING ALCOHOL DO YOU HAVE ON A TYPICAL DAY: PATIENT DOES NOT DRINK
AUDIT-C TOTAL SCORE: 0
HOW OFTEN DO YOU HAVE 6 OR MORE DRINKS ON ONE OCCASION: NEVER

## 2025-01-24 ASSESSMENT — PAIN DESCRIPTION - PAIN TYPE: TYPE: ACUTE PAIN

## 2025-01-24 NOTE — CARE PLAN
The patient's goals for the shift include      The clinical goals for the shift include  pain management

## 2025-01-24 NOTE — ED NOTES
States that he was seen yesterday for the same thing and was told that he had gallstones and was sent home. Pt states that the pain is still there and wants to get admitted to the hospital now. Pt states that he tried to take pain medications that he was sent home with but no relief     Arely Hernandez RN  01/24/25 2845

## 2025-01-24 NOTE — LETTER
January 26, 2025     Patient: Jose Luis Paulson   YOB: 1970   Date of Visit: 1/24/2025       To Whom It May Concern:    It is my medical opinion that Jose Luis Paulson should remain out of work until 2/17/2025 .    Patient was hospitalized on 1/24/25 and underwent laparoscopic cholecystectomy on 1/25/25. He was discharged from the hospital on 1/26/25. He needs to be OFF WORK for 3 full weeks due to lifting restrictions. He cannot lift more than 20 pounds for 3 weeks. He may return to full duty on 2/17/2025.    If you have any questions or concerns, please don't hesitate to call.         Sincerely,    SANDRA Lay, CNP  [phone]    Dr. Raza Velasquez, General Surgeon   Phone Number: [phone]

## 2025-01-24 NOTE — PROGRESS NOTES
Pharmacy Medication History Review    Jose Luis Paulson is a 54 y.o. male admitted for No Principal Problem: There is no principal problem currently on the Problem List. Please update the Problem List and refresh.. Pharmacy reviewed the patient's [id number] medications and allergies for accuracy.    The list below reflectives the updated PTA list. Please review each medication in order reconciliation for additional clarification and justification.  Prior to Admission medications    Medication Sig Start Date End Date Taking? Authorizing Provider   acetaminophen (Tylenol) 500 mg tablet Take 2 tablets (1,000 mg) by mouth every 6 hours if needed for mild pain (1 - 3).   Yes Historical Provider, MD   amLODIPine (Norvasc) 2.5 mg tablet TAKE ONE TABLET BY MOUTH EVERY DAY  Patient taking differently: Take 1 tablet (2.5 mg) by mouth once daily at bedtime. 7/8/24  Yes Hai Sanchez DO   atorvastatin (Lipitor) 10 mg tablet TAKE ONE TABLET BY MOUTH EVERY DAY  Patient taking differently: Take 1 tablet (10 mg) by mouth once daily at bedtime. 5/2/24  Yes Hai Sanchez DO   ibuprofen 600 mg tablet Take 1 tablet (600 mg) by mouth every 6 hours if needed for mild pain (1 - 3) or fever (temp greater than 38.0 C) for up to 7 days. 1/23/25 1/30/25 Yes MARIE Quevedo   metoprolol succinate XL (Toprol-XL) 25 mg 24 hr tablet Take 1 tablet (25 mg) by mouth once daily.  Patient taking differently: Take 1 tablet (25 mg) by mouth once daily at bedtime. 5/30/24  Yes Hai Sanchez DO   ondansetron ODT (Zofran-ODT) 4 mg disintegrating tablet Dissolve 1 tablet (4 mg) in the mouth every 8 hours if needed for nausea or vomiting for up to 5 days. 1/23/25 1/28/25 Yes MARIE Quevedo   pantoprazole (ProtoNix) 40 mg EC tablet Take 1 tablet (40 mg) by mouth once daily.  Patient taking differently: Take 1 tablet (40 mg) by mouth once daily at bedtime. 5/30/24  Yes Hai Sanchez DO   amLODIPine  "(Norvasc) 2.5 mg tablet Take 1 tablet (2.5 mg) by mouth once daily.  Patient not taking: Reported on 1/24/2025 7/8/24  duplicate Hai Sanchez DO   bacitracin 500 unit/gram ointment Apply topically 2 times a day.  Patient not taking: Reported on 1/24/2025 6/1/23  no Hai Sanchez DO   ergocalciferol (Vitamin D-2) 1.25 MG (05075 UT) capsule TAKE ONE CAPSULE BY MOUTH ONCE A WEEK  Patient not taking: Reported on 1/23/2025 6/2/24  no Hai Sanchez DO   gabapentin (Neurontin) 400 mg capsule Take 1 capsule (400 mg) by mouth 3 times a day.  Patient not taking: Reported on 1/23/2025 1/24/24 7/22/24 no Hai Sanchez DO   meloxicam (Mobic) 7.5 mg tablet Take 1 tablet (7.5 mg) by mouth once daily.  Patient not taking: Reported on 1/24/2025 4/24/24 4/24/25 no Hai Sanchez DO   metoprolol succinate XL (Toprol-XL) 25 mg 24 hr tablet TAKE ONE TABLET BY MOUTH EVERY DAY  Patient not taking: Reported on 1/24/2025 5/30/24  duplicate Hai Sanchez DO   miscellaneous medical supply (Blood Pressure Cuff) misc Check BP daily and record a log 6/1/23  no Hai Sanchez DO   needle, disp, 18 G 18 gauge x 1 1/2\" needle Use for medication draw 8/8/24  yes Tani Interiano MD   pantoprazole (ProtoNix) 40 mg EC tablet TAKE ONE TABLET(40 MG)BY MOUTH ONCE DAILY  Patient taking differently: Take 1 tablet (40 mg) by mouth once daily at bedtime. 5/30/24  yes Hai Sanchez DO   sildenafil (Viagra) 100 mg tablet Take 1 tablet (100 mg) by mouth if needed.   yes Juan Pisano MD   syringe with needle, safety (BD Safety-Regi Detachable Needl) 3 mL 22 gauge x 1\" syringe USE TO INJECT IM TESTOSTERONE EVERY 2 WEEKS 8/8/24  yes Tani Interiano MD   testosterone cypionate (Depo-Testosterone) 200 mg/mL injection Inject 1cc IM  every 2 weeks  Patient taking differently: Inject 0.5 mL (100 mg) into the muscle every 14 (fourteen) days. 8/8/24  yes Tani Interiano MD    mg tablet TAKE ONE TABLET BY " MOUTH EVERY 6 TO 8 HOURS AS NEEDED  Patient not taking: Reported on 1/23/2025 5/19/23 1/23/25 no Historical Provider, MD        The list below reflectives the updated allergy list. Please review each documented allergy for additional clarification and justification.  Allergies  Reviewed by Arely Hernandez RN on 1/24/2025   No Known Allergies         Below are additional concerns with the patient's PTA list.      Daniela Pina

## 2025-01-24 NOTE — PROGRESS NOTES
History Of Present Illness  Patient is a 54-year-old male presented to the emergency room today with 3 days of crampy diffuse abdominal pain associated with diarrhea.  He has had difficulty moving his bowels for some time.  The cramps became intense and it showed up in the emergency room with mild nausea but no vomiting.  He denies any fever any chills.  He denies dark urine acholic stools.  He has a prior history only of hernia repair.  There is no history of weight loss or otherwise change in bowel habits.  Review of chart indicates there is a history of testicular cancer.     Past Medical History  He has no past medical history on file.    Surgical History  He has a past surgical history that includes Other surgical history (09/08/2021).     Social History  He reports that he has been smoking cigarettes. He has a 15 pack-year smoking history. He has never used smokeless tobacco. He reports that he does not drink alcohol and does not use drugs.    Family History  No family history on file.     Allergies  Patient has no known allergies.    Review of Systems 10 review of systems otherwise negative     There were no vitals taken for this visit.     Physical Exam BMI 30.9  GENERAL  MENTAL STATUS - Alert. GENERAL APPEARANCE - Cooperative and well groomed. ORIENTATION - Oriented X4. BUILD & NUTRITION - Well nourished and well developed. HYDRATION - Well hydrated.    INTEGUMENTARY  GENERAL CHARACTERISTICS - Overall examination of the patient's skin reveals no rashes. COLOR - not icteric. SKIN MOISTURE - normal skin moisture. TEMPERATURE - normal worth is noted.    HEAD AND NECK  HEAD  HEAD SHAPE - Atraumatic and normocephalic.  TRACHEA - midline.  THYROID  GLAND CHARACTERISTICS - normal size and consistency and no palpable nodules.    EYE  SCLERA/CONJUNCTIVA - BILATERAL - Anicteric.    CHEST AND LUNG EXAM  AUSCULTATION -  BREATH SOUNDS - Clear.    BREAST - Did not examine.    CARDIOVASCULAR  AUSCULTATION: RHYTHM -  Regular. HEART SOUNDS - Normal heart sounds.  MURMURS & OTHER HEART SOUNDS - Auscultation of the heart reveals regular rate and no murmurs.    ABDOMEN  PALPATION/PERCUSSION - Palpation and percussion of the abdomen reveal soft,  no mass and no hepatosplenomegaly.  Patient is tender but it is not localized that is diffuse.  He has no exquisite tenderness in the right upper quadrant no mass.  He has a reducible umbilical hernia.    LYMPHATIC  GENERAL LYMPHATICS  BREAST LYMPHATIC EXAM - No cervical adenopathy, supraclavicular adenopathy or axilliary adenopathy.         Last Recorded Vitals  There were no vitals taken for this visit.    Relevant Results      US right upper quadrant    Result Date: 1/23/2025  Interpreted By:  Kath Vera, STUDY: US RIGHT UPPER QUADRANT;  1/23/2025 3:09 pm   INDICATION: Signs/Symptoms:Gallbladder wall thickening with concern for possible cholelithiasis versus sludge with surrounding fat stranding on CT.     COMPARISON: None.   ACCESSION NUMBER(S): [id number]   ORDERING CLINICIAN: MARCELINA GIVENS   TECHNIQUE: Multiple images of the right upper quadrant were obtained.   FINDINGS: LIVER: The liver measures 19.7 cm in longest axis. It is echogenic suggesting fatty infiltration.     GALLBLADDER: The gallbladder is partially distended and demonstrates large calcified gallstone. Also gallbladder sludge.. The gallbladder wall thickness is 0.5 CM. Sonographic Hassan's sign is negative. Likely mild pericholecystic fluid.     BILE DUCTS: No evidence of intra or extrahepatic biliary dilatation is identified; the common bile duct measures NON VIS.   PANCREAS: Obscured by bowel gas and not evaluated.   RIGHT KIDNEY: The right kidney measures 12.4 cm in length. The renal cortical echogenicity and thickness are within normal limit.  No hydronephrosis or renal calculi are seen. Likely a right renal cyst measuring 12 mm in greatest diameter.       Echogenic fatty infiltrated liver.   Abnormal  gallbladder with large calcified gallstone, likely gallbladder sludge and focal gallbladder wall thickening and pericholecystic fluid. However no ultrasonic Hassan sign. Findings are equivocal for cholecystitis. Clinical correlation and follow-up are warranted.   MACRO: None   Signed by: Kath eVra 1/23/2025 3:31 PM Dictation workstation:   [id number]    CT abdomen pelvis w IV contrast    Result Date: 1/23/2025  Interpreted By:  Anshul Frank, STUDY: CT ABDOMEN PELVIS W IV CONTRAST;  1/23/2025 1:01 pm   INDICATION: Signs/Symptoms:Abdominal pain and distention, worsening over the past 3 days..     COMPARISON: CT abdomen and pelvis with contrast 24 January 2024   ACCESSION NUMBER(S): [id number]   ORDERING CLINICIAN: MARCELINA GIVENS   TECHNIQUE: CT of the abdomen and pelvis from the lung bases through the symphysis pubis after the uneventful administration of intravenous contrast (75 mL Omnipaque 350). No oral contrast.   FINDINGS: LOWER CHEST: No acute airspace disease.   BONES: No acute skeletal findings.   LIVER: Fatty and borderline for size. No cirrhotic change. All vessels are patent   SPLEEN: Normal. No enlargement, mass or evidence of splenic vein thrombosis.   PANCREAS: Normal. No CT evidence of acute or chronic pancreatitis. No duct dilation. No mass.   GALLBLADDER: Borderline dilated. Surrounded by inflammatory fat stranding. Confluent increased attenuation throughout the lumen towards the fundus. Suspect but cannot be certain of stones on CT   BILE DUCTS: Normal. No biliary duct dilation.   ADRENAL GLANDS: Normal. No nodule or mass.   KIDNEYS AND URETERS: Normal except for one small simple right renal cyst towards the lower pole. No hydronephrosis on either side.  No mass.  Symmetric enhancement.  No infarct or CT evidence of acute pyelonephritis.  No substantial radiodense stone.  Tiny stones and radiolucent stones could be occult on CT.   LYMPH NODES: No adenopathy, intraperitoneal, retroperitoneal,  pelvic or otherwise   APPENDIX: Normal.  Not dilated, thick walled or in any other way inflamed in appearance.  No inflammatory change about the appendix.   COLON: Normal. No sign of acute diverticulitis or other colitis. No annular constricting mass.   SMALL BOWEL: Normal. No small bowel dilation or any other sign of small bowel obstruction. No sign of active inflammatory bowel disease.   STOMACH / DUODENUM: Grossly normal by CT which has limited sensitivity and specificity for the stomach and duodenum.   RETROPERITONEUM: Normal.  No acute hemorrhage or inflammatory change. Lymph nodes in a separate dedicated section.   OMENTUM, MESENTERY AND PERITONEAL SPACES: Free intraperitoneal air: Negative Free intraperitoneal fluid: Negative Abscess: Negative Other: n/a   URINARY BLADDER: Normal. No wall thickening, large diverticula, radiodense stone or surrounding inflammatory change.   PELVIS: Mild enlargement of the prostate. No pelvic mass, adenopathy or free fluid.   VASCULATURE: No abdominal aortic or iliac artery aneurysm. No high grade stenosis of the major abdominal aortic branch vessels. Portal venous system patent.   ABDOMINAL WALL: Hernia: Small unchanged fat containing umbilical Other: No acute or contributory abnormality.       Recommend right upper quadrant ultrasound today to further assess the gallbladder. It is borderline dilated, surrounded by new inflammatory fat stranding, and the luminal contents are not uniform in CT attenuation, suggesting at least sludge if not stones. The gallbladder is probably partially contracted at its mid segment, trapping sludge in the fundus and distally. Focal form of adenomyomatosis may be present at the tip of the fundus   No other acute findings   The liver is mildly enlarged and fatty but not cirrhotic, unchanged   No acute pancreatitis, hydronephrosis/urinary stone or any other acute solid organ findings   Normal appendix   No acute diverticulitis or other colitis   No  bowel obstruction, perforation, abscess or free fluid   MACRO: None   Signed by: Anshul Ryanadwoa 1/23/2025 1:33 PM Dictation workstation:   [id number]        @VC [email or web address]@    Assessment/Plan       Patient with the diffuse abdominal pain diarrhea.  His only findings on imaging are gallstones with question of fluid around the gallbladder but sonographic Hassan sign is negative and exam is negative.  His CBC demonstrates no evidence of elevated white blood cell count no left shift.  In addition his liver profile is within normal limits.  I discussed with the patient possibility that this could be his gallbladder but also more likely to be a viral syndrome secondary to the diarrhea.  We discussed the option of possible admission and observation versus return home with analgesia increase fluids and follow-up in the office.  I also instructed the patient to return to the emergency room for any severe pain nausea vomiting fever chills.  At this point I do not believe the findings are consistent with acute cholecystitis and I do not believe the current condition warrants admission.       I spent 40 minutes in the professional and overall care of this patient.      Aren Kramer MD

## 2025-01-24 NOTE — H&P
"History Of Present Illness  Jose Luis Paulson is a 54 y.o. male presenting with acute cholecystitis.    Patient was just seen and evaluated in ED yesterday. At that time he had no elevation of his WBC, LFTs were normal, and radiographic imaging was equivocal for cholecystitis. Additionally, patient had been experiencing diarrhea which raised the suspicion for a viral etiology for his abdominal pain. He was discharged with return instructions.    Returned to the ED today because \"the pain was so bad I couldn't stand it\". He had been taking ibuprofen without relief. Patient now states that he had chills last night. Now with leukocytosis (12.1), but LFTs remain WNL.    Patient has a medical/surgical history significant for testicular cancer s/p orchiectomy and implant (2022 - [facility]), HTN, and low T.      Past Medical History  As noted above     Surgical History  Past Surgical History:   Procedure Laterality Date    OTHER SURGICAL HISTORY  09/08/2021    Umbilical hernia repair    Right radial orchiectomy with placement of implant      Social History  He reports that he has been smoking cigarettes. He has a 15 pack-year smoking history. He has never used smokeless tobacco. He reports that he does not drink alcohol and does not use drugs.    Family History  No family history on file.     Allergies  Patient has no known allergies.    Review of Systems     Physical Exam  Vitals reviewed.   Constitutional:       General: He is not in acute distress.     Appearance: He is ill-appearing.   Eyes:      General: No scleral icterus.     Conjunctiva/sclera: Conjunctivae normal.   Cardiovascular:      Rate and Rhythm: Normal rate and regular rhythm.      Pulses: Normal pulses.      Heart sounds: Normal heart sounds.   Pulmonary:      Effort: Pulmonary effort is normal.   Abdominal:      General: Bowel sounds are decreased. There is no distension.      Palpations: Abdomen is soft.      Tenderness: There is no abdominal tenderness.      " "Comments: H/o umbilical hernia repair   Skin:     Coloration: Skin is not jaundiced.   Neurological:      Mental Status: He is alert and oriented to person, place, and time. Mental status is at baseline.   Psychiatric:         Behavior: Behavior normal.          Last Recorded Vitals  Blood pressure (!) 166/93, pulse 69, temperature 36 °C (96.8 °F), temperature source Temporal, resp. rate 17, height 1.803 m (5' 11\"), weight 103 kg (228 lb), SpO2 95%.    Relevant Results  Results for orders placed or performed during the hospital encounter of 01/24/25 (from the past 24 hours)   CBC and Auto Differential   Result Value Ref Range    WBC 12.1 (H) 4.4 - 11.3 x10*3/uL    nRBC 0.0 0.0 - 0.0 /100 WBCs    RBC 5.04 4.50 - 5.90 x10*6/uL    Hemoglobin 17.2 13.5 - 17.5 g/dL    Hematocrit 48.3 41.0 - 52.0 %    MCV 96 80 - 100 fL    MCH 34.1 (H) 26.0 - 34.0 pg    MCHC 35.6 32.0 - 36.0 g/dL    RDW 11.9 11.5 - 14.5 %    Platelets 158 150 - 450 x10*3/uL    Neutrophils % 74.0 40.0 - 80.0 %    Immature Granulocytes %, Automated 0.3 0.0 - 0.9 %    Lymphocytes % 16.6 13.0 - 44.0 %    Monocytes % 8.3 2.0 - 10.0 %    Eosinophils % 0.5 0.0 - 6.0 %    Basophils % 0.3 0.0 - 2.0 %    Neutrophils Absolute 8.94 (H) 1.20 - 7.70 x10*3/uL    Immature Granulocytes Absolute, Automated 0.04 0.00 - 0.70 x10*3/uL    Lymphocytes Absolute 2.01 1.20 - 4.80 x10*3/uL    Monocytes Absolute 1.01 (H) 0.10 - 1.00 x10*3/uL    Eosinophils Absolute 0.06 0.00 - 0.70 x10*3/uL    Basophils Absolute 0.04 0.00 - 0.10 x10*3/uL   Magnesium   Result Value Ref Range    Magnesium 1.91 1.60 - 2.40 mg/dL   Comprehensive metabolic panel   Result Value Ref Range    Glucose 177 (H) 74 - 99 mg/dL    Sodium 137 136 - 145 mmol/L    Potassium 4.2 3.5 - 5.3 mmol/L    Chloride 100 98 - 107 mmol/L    Bicarbonate 28 21 - 32 mmol/L    Anion Gap 13 10 - 20 mmol/L    Urea Nitrogen 10 6 - 23 mg/dL    Creatinine 1.01 0.50 - 1.30 mg/dL    eGFR 87 >60 mL/min/1.73m*2    Calcium 10.0 8.6 - 10.3 " mg/dL    Albumin 4.7 3.4 - 5.0 g/dL    Alkaline Phosphatase 89 33 - 120 U/L    Total Protein 7.7 6.4 - 8.2 g/dL    AST 23 9 - 39 U/L    Bilirubin, Total 0.7 0.0 - 1.2 mg/dL    ALT 43 10 - 52 U/L   Lipase   Result Value Ref Range    Lipase 62 9 - 82 U/L   Lactate   Result Value Ref Range    Lactate 1.2 0.4 - 2.0 mmol/L            Assessment/Plan   Assessment & Plan  Acute cholecystitis      54 year old male with a history significant for testicular cancer s/p right radical orchiectomy and implant, HTN, low T, and h/o umbilical hernia repair with mesh returned to [facility] ED with ongoing/worsening abdominal pain. Labs/imaging now consistent with acute cholecystitis. Admitted to surgery for ongoing care.    #acute calculous cholecystitis   - NPO  - plan for lap juliet; timing TBD  - multimodal pain control  - IV antiemetics PRN  - IV fluids    #h/o HTN  - hold home PO meds for now  - PRN IV hydralazine for HTN    Ppx:  SCDs, SQH    The patient was discussed with the attending surgeon Dr. Kramer who agrees patient needs lap juliet; again, timing is still TBD    Dispo:  Admit to hospital; RNF    I spent 30 minutes in the professional and overall care of this patient.  Greater than 50% of this time was spent counseling patient, reviewing plan of care, and in coordination of care.        Martha Cintron, APRN-CNP

## 2025-01-24 NOTE — TELEPHONE ENCOUNTER
Patient called the office stating he was in the ED and wanted a surgeon to come down and take out his Gallbladder . I explained to the patient that we dont do that, that if he is in the ED they will evaluate and treat him. He wanted to know if someone could just come get him for surgery, again explained its not how it works. Offered patient an appointment to which he declined.--    Shanna Cedeno - Patient Navigator

## 2025-01-24 NOTE — PROGRESS NOTES
Jose Luis Paulson is a 54 y.o. male on day 0 of admission presenting with No Principal Problem: There is no principal problem currently on the Problem List. Please update the Problem List and refresh..    Subjective   See note       Objective     Physical Exam    Last Recorded Vitals  Blood pressure 150/86, pulse 78, temperature 36.3 °C (97.3 °F), temperature source Temporal, resp. rate 16, height 1.829 m (6'), weight 103 kg (228 lb), SpO2 96%.  Intake/Output last 3 Shifts:  No intake/output data recorded.    Relevant Results                              Assessment/Plan   Assessment & Plan    Gastroenteritis abdominal pain       I spent 30 minutes in the professional and overall care of this patient.      Aren Kramer MD

## 2025-01-24 NOTE — ED PROVIDER NOTES
HPI   Chief Complaint   Patient presents with    Abdominal Pain     States that he was seen yesterday for the same thing and was told that he had gallstones and was sent home. Pt states that the pain is still there and wants to get admitted to the hospital now. Pt states that he tried to take pain medications that he was sent home with but no relief       Patient returns to the emergency department due to ongoing epigastric/right upper quadrant pain and distention.  Patient states last evening he started developing a slight fever and chills.  Patient is unable to tolerate significant food since discharge.  Based on discharge instructions and symptoms seem to have worsened instead of improved after his discharge he return to the emergency department for reevaluation.      History provided by:  Patient   used: No            Patient History   No past medical history on file.  Past Surgical History:   Procedure Laterality Date    OTHER SURGICAL HISTORY  09/08/2021    Umbilical hernia repair     No family history on file.  Social History     Tobacco Use    Smoking status: Every Day     Current packs/day: 1.00     Average packs/day: 1 pack/day for 15.0 years (15.0 ttl pk-yrs)     Types: Cigarettes    Smokeless tobacco: Never   Substance Use Topics    Alcohol use: Never    Drug use: Never       Physical Exam   ED Triage Vitals [01/24/25 0845]   Temperature Heart Rate Respirations BP   36 °C (96.8 °F) 69 17 (!) 166/93      Pulse Ox Temp Source Heart Rate Source Patient Position   95 % Temporal Monitor Sitting      BP Location FiO2 (%)     Right arm --       Physical Exam  Vitals and nursing note reviewed.   Constitutional:       General: He is not in acute distress.     Appearance: He is well-developed.   HENT:      Head: Normocephalic and atraumatic.   Eyes:      Conjunctiva/sclera: Conjunctivae normal.   Cardiovascular:      Rate and Rhythm: Normal rate and regular rhythm.      Heart sounds: No murmur  heard.  Pulmonary:      Effort: Pulmonary effort is normal. No respiratory distress.      Breath sounds: Normal breath sounds.   Abdominal:      General: Abdomen is protuberant. Bowel sounds are normal. There is distension.      Palpations: Abdomen is soft.      Tenderness: There is abdominal tenderness in the right upper quadrant. There is guarding. There is no right CVA tenderness or left CVA tenderness. Positive signs include Hassan's sign.   Musculoskeletal:         General: No swelling.      Cervical back: Neck supple.   Skin:     General: Skin is warm and dry.      Capillary Refill: Capillary refill takes less than 2 seconds.   Neurological:      Mental Status: He is alert.   Psychiatric:         Mood and Affect: Mood normal.           ED Course & MDM   ED Course as of 01/24/25 1311   Fri Jan 24, 2025   1057 CBC and Auto Differential(!)  New leukocytosis, no anemia or thrombocytopenia [WS]   1140 Magnesium  Normal [WS]   1141 Comprehensive metabolic panel(!)  Electrolytes are stable, no kidney injury, no liver enzyme elevation. [WS]   1141 Lipase  Normal, unlikely pancreatitis [WS]   1141 Lactate  Not elevated [WS]   1221 US abdomen limited  Sludge and stone within the gallbladder with pericholecystic fluid and  wall thickening, suggestive of acute cholecystitis   [WS]      ED Course User Index  [WS] Holden Murphy, APRN-CNP         Diagnoses as of 01/24/25 1311   Acute cholecystitis                 No data recorded                                 Medical Decision Making    Medical Decision Making & ED Course  Medical Decision Making:  Patient returns to the emergency department due to ongoing epigastric/right upper quadrant pain and distention.  Patient states last evening he started developing a slight fever and chills.  Patient is unable to tolerate significant food since discharge.  Based on discharge instructions and symptoms seem to have worsened instead of improved after his discharge he return to the  emergency department for reevaluation.  Given patient's recurrence of symptoms we did obtain workup similar to yesterday and he now has a slight leukocytosis.  Ultrasound was repeated was limited to the gallbladder does show today acute cholecystitis.  Remaining labs are stable.  I reached out to the surgery team who will admit the patient today.  Patient was given 1 dose of IV Dilaudid for symptomatic relief which did seem to relieve his pain.    --  Differential diagnoses considered include but are not limited to: Cholecystitis, bowel perforation, intra-abdominal abscess, bowel obstruction, pancreatitis.     Social Determinants of Health which Significantly Impact Care: None identified     EKG Independent Interpretation: EKG not obtained    Independent Result Review and Interpretation: Relevant laboratory and radiographic results were reviewed and independently interpreted by myself.  As necessary, they are commented on in the ED Course.    Chronic conditions affecting the patient's care: As documented above in [facility]    The patient was discussed with the following consultants/services: Dr. Kramer General Surgery/Admitting Provider who accepted the patient for admission    Care Considerations: As documented above in [facility]    ED Course:  ED Course as of 01/24/25 1312  ------------------------------------------------------------  Time: 01/24 1057  Value: CBC and Auto Differential(!)  Comment: New leukocytosis, no anemia or thrombocytopenia  By: MARIE Quevedo  ------------------------------------------------------------  Time: 01/24 1140  Value: Magnesium  Comment: Normal  By: MARIE Quevedo  ------------------------------------------------------------  Time: 01/24 1141  Value: Comprehensive metabolic panel(!)  Comment: Electrolytes are stable, no kidney injury, no liver enzyme elevation.  By: SANDRA Quevedo-CNP  ------------------------------------------------------------  Time: 01/24  1141  Value: Lipase  Comment: Normal, unlikely pancreatitis  By: MARIE Quevedo  ------------------------------------------------------------  Time: 01/24 1141  Value: Lactate  Comment: Not elevated  By: MARIE Quevedo  ------------------------------------------------------------  Time: 01/24 1221  Value: US abdomen limited  Comment: Sludge and stone within the gallbladder with pericholecystic fluid andwall thickening, suggestive of acute cholecystitis  By: MARIE Quevedo    ------------------------------------------------------------  Diagnoses as of 01/24/25 1312  Acute cholecystitis     Disposition  As a result of their workup, the patient will require admission to the hospital.  The patient was informed of his diagnosis.  The patient was given the opportunity to ask questions and I answered them. The patient agreed to be admitted to the hospital.      This was a shared visit with an ED attending.  The patient was seen and discussed with the ED attending    MARIE Quevedo  Emergency Medicine        Amount and/or Complexity of Data Reviewed  Labs: ordered. Decision-making details documented in ED Course.  Radiology: ordered and independent interpretation performed. Decision-making details documented in ED Course.    Risk  Prescription drug management.  Parenteral controlled substances.  Decision regarding hospitalization.        Procedure  Critical Care    Performed by: MARIE Quevedo  Authorized by: Erica Pardo MD    Critical care provider statement:     Critical care time (minutes):  15    Critical care was necessary to treat or prevent imminent or life-threatening deterioration of the following conditions: Acute cholecystitis.    Critical care was time spent personally by me on the following activities:  Blood draw for specimens, ordering and performing treatments and interventions, development of treatment plan with patient or surrogate, ordering  and review of laboratory studies, ordering and review of radiographic studies, pulse oximetry, re-evaluation of patient's condition, evaluation of patient's response to treatment, examination of patient, review of old charts, obtaining history from patient or surrogate and discussions with consultants    Care discussed with: admitting provider         SANDRA Quevedo-CNP  01/24/25 3654

## 2025-01-25 ENCOUNTER — ANESTHESIA EVENT (OUTPATIENT)
Dept: OPERATING ROOM | Facility: HOSPITAL | Age: 55
End: 2025-01-25
Payer: COMMERCIAL

## 2025-01-25 ENCOUNTER — ANESTHESIA (OUTPATIENT)
Dept: OPERATING ROOM | Facility: HOSPITAL | Age: 55
End: 2025-01-25
Payer: COMMERCIAL

## 2025-01-25 ENCOUNTER — PREP FOR PROCEDURE (OUTPATIENT)
Dept: SURGERY | Facility: HOSPITAL | Age: 55
End: 2025-01-25
Payer: COMMERCIAL

## 2025-01-25 ENCOUNTER — APPOINTMENT (OUTPATIENT)
Dept: RADIOLOGY | Facility: HOSPITAL | Age: 55
DRG: 419 | End: 2025-01-25
Payer: COMMERCIAL

## 2025-01-25 DIAGNOSIS — K80.00 CALCULUS OF GALLBLADDER WITH ACUTE CHOLECYSTITIS WITHOUT OBSTRUCTION: Primary | ICD-10-CM

## 2025-01-25 LAB
ALBUMIN SERPL BCP-MCNC: 4.1 G/DL (ref 3.4–5)
ALP SERPL-CCNC: 76 U/L (ref 33–120)
ALT SERPL W P-5'-P-CCNC: 31 U/L (ref 10–52)
ANION GAP SERPL CALC-SCNC: 15 MMOL/L (ref 10–20)
AST SERPL W P-5'-P-CCNC: 21 U/L (ref 9–39)
BILIRUB SERPL-MCNC: 1 MG/DL (ref 0–1.2)
BUN SERPL-MCNC: 10 MG/DL (ref 6–23)
CALCIUM SERPL-MCNC: 8.8 MG/DL (ref 8.6–10.3)
CHLORIDE SERPL-SCNC: 103 MMOL/L (ref 98–107)
CO2 SERPL-SCNC: 24 MMOL/L (ref 21–32)
CREAT SERPL-MCNC: 1.03 MG/DL (ref 0.5–1.3)
EGFRCR SERPLBLD CKD-EPI 2021: 85 ML/MIN/1.73M*2
ERYTHROCYTE [DISTWIDTH] IN BLOOD BY AUTOMATED COUNT: 11.6 % (ref 11.5–14.5)
GLUCOSE SERPL-MCNC: 126 MG/DL (ref 74–99)
HCT VFR BLD AUTO: 46.2 % (ref 41–52)
HGB BLD-MCNC: 16.3 G/DL (ref 13.5–17.5)
MCH RBC QN AUTO: 34.1 PG (ref 26–34)
MCHC RBC AUTO-ENTMCNC: 35.3 G/DL (ref 32–36)
MCV RBC AUTO: 97 FL (ref 80–100)
NRBC BLD-RTO: 0 /100 WBCS (ref 0–0)
PLATELET # BLD AUTO: 144 X10*3/UL (ref 150–450)
POTASSIUM SERPL-SCNC: 3.9 MMOL/L (ref 3.5–5.3)
PROT SERPL-MCNC: 6.8 G/DL (ref 6.4–8.2)
RBC # BLD AUTO: 4.78 X10*6/UL (ref 4.5–5.9)
SODIUM SERPL-SCNC: 138 MMOL/L (ref 136–145)
WBC # BLD AUTO: 8.8 X10*3/UL (ref 4.4–11.3)

## 2025-01-25 PROCEDURE — 3600000003 HC OR TIME - INITIAL BASE CHARGE - PROCEDURE LEVEL THREE: Performed by: SURGERY

## 2025-01-25 PROCEDURE — 1100000001 HC PRIVATE ROOM DAILY

## 2025-01-25 PROCEDURE — 2500000004 HC RX 250 GENERAL PHARMACY W/ HCPCS (ALT 636 FOR OP/ED)

## 2025-01-25 PROCEDURE — 2500000001 HC RX 250 WO HCPCS SELF ADMINISTERED DRUGS (ALT 637 FOR MEDICARE OP)

## 2025-01-25 PROCEDURE — 2500000005 HC RX 250 GENERAL PHARMACY W/O HCPCS: Performed by: SURGERY

## 2025-01-25 PROCEDURE — 85027 COMPLETE CBC AUTOMATED: CPT | Performed by: REGISTERED NURSE

## 2025-01-25 PROCEDURE — 47563 LAPARO CHOLECYSTECTOMY/GRAPH: CPT | Performed by: SURGERY

## 2025-01-25 PROCEDURE — 2780000003 HC OR 278 NO HCPCS: Performed by: SURGERY

## 2025-01-25 PROCEDURE — 7100000002 HC RECOVERY ROOM TIME - EACH INCREMENTAL 1 MINUTE: Performed by: SURGERY

## 2025-01-25 PROCEDURE — 87205 SMEAR GRAM STAIN: CPT | Mod: PARLAB | Performed by: SURGERY

## 2025-01-25 PROCEDURE — 2500000004 HC RX 250 GENERAL PHARMACY W/ HCPCS (ALT 636 FOR OP/ED): Mod: JZ | Performed by: ANESTHESIOLOGIST ASSISTANT

## 2025-01-25 PROCEDURE — 3700000001 HC GENERAL ANESTHESIA TIME - INITIAL BASE CHARGE: Performed by: SURGERY

## 2025-01-25 PROCEDURE — 0WQF4ZZ REPAIR ABDOMINAL WALL, PERCUTANEOUS ENDOSCOPIC APPROACH: ICD-10-PCS | Performed by: SURGERY

## 2025-01-25 PROCEDURE — 3700000002 HC GENERAL ANESTHESIA TIME - EACH INCREMENTAL 1 MINUTE: Performed by: SURGERY

## 2025-01-25 PROCEDURE — 80053 COMPREHEN METABOLIC PANEL: CPT | Performed by: REGISTERED NURSE

## 2025-01-25 PROCEDURE — 2720000007 HC OR 272 NO HCPCS: Performed by: SURGERY

## 2025-01-25 PROCEDURE — 7100000001 HC RECOVERY ROOM TIME - INITIAL BASE CHARGE: Performed by: SURGERY

## 2025-01-25 PROCEDURE — 2500000004 HC RX 250 GENERAL PHARMACY W/ HCPCS (ALT 636 FOR OP/ED): Mod: JZ | Performed by: REGISTERED NURSE

## 2025-01-25 PROCEDURE — 0FT44ZZ RESECTION OF GALLBLADDER, PERCUTANEOUS ENDOSCOPIC APPROACH: ICD-10-PCS | Performed by: SURGERY

## 2025-01-25 PROCEDURE — 3600000008 HC OR TIME - EACH INCREMENTAL 1 MINUTE - PROCEDURE LEVEL THREE: Performed by: SURGERY

## 2025-01-25 PROCEDURE — 2500000004 HC RX 250 GENERAL PHARMACY W/ HCPCS (ALT 636 FOR OP/ED): Performed by: REGISTERED NURSE

## 2025-01-25 PROCEDURE — C1729 CATH, DRAINAGE: HCPCS | Performed by: SURGERY

## 2025-01-25 PROCEDURE — 74300 X-RAY BILE DUCTS/PANCREAS: CPT | Performed by: SURGERY

## 2025-01-25 PROCEDURE — 36415 COLL VENOUS BLD VENIPUNCTURE: CPT | Performed by: REGISTERED NURSE

## 2025-01-25 PROCEDURE — 2500000004 HC RX 250 GENERAL PHARMACY W/ HCPCS (ALT 636 FOR OP/ED): Performed by: SURGERY

## 2025-01-25 PROCEDURE — 76000 FLUOROSCOPY <1 HR PHYS/QHP: CPT

## 2025-01-25 RX ORDER — MEPERIDINE HYDROCHLORIDE 50 MG/ML
12.5 INJECTION INTRAMUSCULAR; INTRAVENOUS; SUBCUTANEOUS EVERY 10 MIN PRN
Status: DISCONTINUED | OUTPATIENT
Start: 2025-01-25 | End: 2025-01-25 | Stop reason: HOSPADM

## 2025-01-25 RX ORDER — AMLODIPINE BESYLATE 5 MG/1
2.5 TABLET ORAL NIGHTLY
Status: DISCONTINUED | OUTPATIENT
Start: 2025-01-25 | End: 2025-01-26 | Stop reason: HOSPADM

## 2025-01-25 RX ORDER — ROCURONIUM BROMIDE 10 MG/ML
INJECTION, SOLUTION INTRAVENOUS AS NEEDED
Status: DISCONTINUED | OUTPATIENT
Start: 2025-01-25 | End: 2025-01-25

## 2025-01-25 RX ORDER — HYDROMORPHONE HYDROCHLORIDE 1 MG/ML
INJECTION, SOLUTION INTRAMUSCULAR; INTRAVENOUS; SUBCUTANEOUS AS NEEDED
Status: DISCONTINUED | OUTPATIENT
Start: 2025-01-25 | End: 2025-01-25

## 2025-01-25 RX ORDER — KETOROLAC TROMETHAMINE 30 MG/ML
15 INJECTION, SOLUTION INTRAMUSCULAR; INTRAVENOUS EVERY 6 HOURS SCHEDULED
Status: DISCONTINUED | OUTPATIENT
Start: 2025-01-25 | End: 2025-01-26 | Stop reason: HOSPADM

## 2025-01-25 RX ORDER — PROPOFOL 10 MG/ML
INJECTION, EMULSION INTRAVENOUS AS NEEDED
Status: DISCONTINUED | OUTPATIENT
Start: 2025-01-25 | End: 2025-01-25

## 2025-01-25 RX ORDER — OXYCODONE HYDROCHLORIDE 5 MG/1
5 TABLET ORAL EVERY 4 HOURS PRN
Status: DISCONTINUED | OUTPATIENT
Start: 2025-01-25 | End: 2025-01-26

## 2025-01-25 RX ORDER — METOPROLOL SUCCINATE 25 MG/1
25 TABLET, EXTENDED RELEASE ORAL NIGHTLY
Status: DISCONTINUED | OUTPATIENT
Start: 2025-01-25 | End: 2025-01-26 | Stop reason: HOSPADM

## 2025-01-25 RX ORDER — SODIUM CHLORIDE, SODIUM LACTATE, POTASSIUM CHLORIDE, CALCIUM CHLORIDE 600; 310; 30; 20 MG/100ML; MG/100ML; MG/100ML; MG/100ML
100 INJECTION, SOLUTION INTRAVENOUS CONTINUOUS
Status: DISCONTINUED | OUTPATIENT
Start: 2025-01-25 | End: 2025-01-25

## 2025-01-25 RX ORDER — ACETAMINOPHEN 325 MG/1
650 TABLET ORAL EVERY 4 HOURS PRN
Status: DISCONTINUED | OUTPATIENT
Start: 2025-01-25 | End: 2025-01-25

## 2025-01-25 RX ORDER — LIDOCAINE HYDROCHLORIDE 10 MG/ML
0.1 INJECTION, SOLUTION INFILTRATION; PERINEURAL ONCE
Status: DISCONTINUED | OUTPATIENT
Start: 2025-01-25 | End: 2025-01-25 | Stop reason: HOSPADM

## 2025-01-25 RX ORDER — MORPHINE SULFATE 10 MG/ML
4 INJECTION, SOLUTION INTRAMUSCULAR; INTRAVENOUS EVERY 4 HOURS PRN
Status: DISCONTINUED | OUTPATIENT
Start: 2025-01-25 | End: 2025-01-26

## 2025-01-25 RX ORDER — ONDANSETRON HYDROCHLORIDE 2 MG/ML
4 INJECTION, SOLUTION INTRAVENOUS ONCE AS NEEDED
Status: DISCONTINUED | OUTPATIENT
Start: 2025-01-25 | End: 2025-01-25 | Stop reason: HOSPADM

## 2025-01-25 RX ORDER — ACETAMINOPHEN 325 MG/1
975 TABLET ORAL 3 TIMES DAILY
Status: DISCONTINUED | OUTPATIENT
Start: 2025-01-25 | End: 2025-01-26 | Stop reason: HOSPADM

## 2025-01-25 RX ORDER — ONDANSETRON HYDROCHLORIDE 2 MG/ML
INJECTION, SOLUTION INTRAVENOUS AS NEEDED
Status: DISCONTINUED | OUTPATIENT
Start: 2025-01-25 | End: 2025-01-25

## 2025-01-25 RX ORDER — BUPIVACAINE HYDROCHLORIDE 5 MG/ML
INJECTION, SOLUTION EPIDURAL; INTRACAUDAL AS NEEDED
Status: DISCONTINUED | OUTPATIENT
Start: 2025-01-25 | End: 2025-01-25 | Stop reason: HOSPADM

## 2025-01-25 RX ORDER — HEPARIN SODIUM 1000 [USP'U]/ML
INJECTION, SOLUTION INTRAVENOUS; SUBCUTANEOUS AS NEEDED
Status: DISCONTINUED | OUTPATIENT
Start: 2025-01-25 | End: 2025-01-25 | Stop reason: HOSPADM

## 2025-01-25 RX ORDER — GABAPENTIN 300 MG/1
300 CAPSULE ORAL 3 TIMES DAILY
Status: DISCONTINUED | OUTPATIENT
Start: 2025-01-25 | End: 2025-01-26 | Stop reason: HOSPADM

## 2025-01-25 RX ORDER — FENTANYL CITRATE 50 UG/ML
INJECTION, SOLUTION INTRAMUSCULAR; INTRAVENOUS AS NEEDED
Status: DISCONTINUED | OUTPATIENT
Start: 2025-01-25 | End: 2025-01-25

## 2025-01-25 RX ORDER — MIDAZOLAM HYDROCHLORIDE 1 MG/ML
1 INJECTION, SOLUTION INTRAMUSCULAR; INTRAVENOUS ONCE AS NEEDED
Status: DISCONTINUED | OUTPATIENT
Start: 2025-01-25 | End: 2025-01-25 | Stop reason: HOSPADM

## 2025-01-25 RX ORDER — SODIUM CHLORIDE 0.9 G/100ML
INJECTION, SOLUTION IRRIGATION AS NEEDED
Status: DISCONTINUED | OUTPATIENT
Start: 2025-01-25 | End: 2025-01-25 | Stop reason: HOSPADM

## 2025-01-25 RX ORDER — KETOROLAC TROMETHAMINE 30 MG/ML
15 INJECTION, SOLUTION INTRAMUSCULAR; INTRAVENOUS EVERY 6 HOURS SCHEDULED
Status: DISCONTINUED | OUTPATIENT
Start: 2025-01-25 | End: 2025-01-25

## 2025-01-25 RX ORDER — KETOROLAC TROMETHAMINE 30 MG/ML
INJECTION, SOLUTION INTRAMUSCULAR; INTRAVENOUS AS NEEDED
Status: DISCONTINUED | OUTPATIENT
Start: 2025-01-25 | End: 2025-01-25

## 2025-01-25 RX ORDER — ATORVASTATIN CALCIUM 10 MG/1
10 TABLET, FILM COATED ORAL NIGHTLY
Status: DISCONTINUED | OUTPATIENT
Start: 2025-01-25 | End: 2025-01-26 | Stop reason: HOSPADM

## 2025-01-25 RX ORDER — HYDRALAZINE HYDROCHLORIDE 20 MG/ML
5 INJECTION INTRAMUSCULAR; INTRAVENOUS EVERY 30 MIN PRN
Status: DISCONTINUED | OUTPATIENT
Start: 2025-01-25 | End: 2025-01-25 | Stop reason: HOSPADM

## 2025-01-25 RX ORDER — ALBUTEROL SULFATE 0.83 MG/ML
2.5 SOLUTION RESPIRATORY (INHALATION) ONCE AS NEEDED
Status: DISCONTINUED | OUTPATIENT
Start: 2025-01-25 | End: 2025-01-25 | Stop reason: HOSPADM

## 2025-01-25 RX ORDER — LIDOCAINE HCL/PF 100 MG/5ML
SYRINGE (ML) INTRAVENOUS AS NEEDED
Status: DISCONTINUED | OUTPATIENT
Start: 2025-01-25 | End: 2025-01-25

## 2025-01-25 RX ORDER — LABETALOL HYDROCHLORIDE 5 MG/ML
5 INJECTION, SOLUTION INTRAVENOUS ONCE AS NEEDED
Status: DISCONTINUED | OUTPATIENT
Start: 2025-01-25 | End: 2025-01-25 | Stop reason: HOSPADM

## 2025-01-25 RX ORDER — MIDAZOLAM HYDROCHLORIDE 1 MG/ML
INJECTION, SOLUTION INTRAMUSCULAR; INTRAVENOUS AS NEEDED
Status: DISCONTINUED | OUTPATIENT
Start: 2025-01-25 | End: 2025-01-25

## 2025-01-25 RX ADMIN — FENTANYL CITRATE 50 MCG: 50 INJECTION, SOLUTION INTRAMUSCULAR; INTRAVENOUS at 12:33

## 2025-01-25 RX ADMIN — FENTANYL CITRATE 50 MCG: 50 INJECTION, SOLUTION INTRAMUSCULAR; INTRAVENOUS at 13:30

## 2025-01-25 RX ADMIN — PIPERACILLIN SODIUM AND TAZOBACTAM SODIUM 3.38 G: 3; .375 INJECTION, SOLUTION INTRAVENOUS at 08:44

## 2025-01-25 RX ADMIN — HYDROMORPHONE HYDROCHLORIDE 1 MG: 1 INJECTION, SOLUTION INTRAMUSCULAR; INTRAVENOUS; SUBCUTANEOUS at 05:39

## 2025-01-25 RX ADMIN — ONDANSETRON 4 MG: 2 INJECTION INTRAMUSCULAR; INTRAVENOUS at 13:19

## 2025-01-25 RX ADMIN — ROCURONIUM BROMIDE 20 MG: 10 INJECTION, SOLUTION INTRAVENOUS at 12:55

## 2025-01-25 RX ADMIN — ACETAMINOPHEN 975 MG: 325 TABLET, FILM COATED ORAL at 15:20

## 2025-01-25 RX ADMIN — LIDOCAINE HYDROCHLORIDE 100 MG: 20 INJECTION INTRAVENOUS at 11:49

## 2025-01-25 RX ADMIN — SODIUM CHLORIDE: 9 INJECTION, SOLUTION INTRAVENOUS at 13:25

## 2025-01-25 RX ADMIN — ACETAMINOPHEN 975 MG: 325 TABLET, FILM COATED ORAL at 20:04

## 2025-01-25 RX ADMIN — ATORVASTATIN CALCIUM 10 MG: 10 TABLET, FILM COATED ORAL at 20:05

## 2025-01-25 RX ADMIN — FENTANYL CITRATE 100 MCG: 50 INJECTION, SOLUTION INTRAMUSCULAR; INTRAVENOUS at 11:49

## 2025-01-25 RX ADMIN — HYDROMORPHONE HYDROCHLORIDE 0.5 MG: 1 INJECTION, SOLUTION INTRAMUSCULAR; INTRAVENOUS; SUBCUTANEOUS at 13:19

## 2025-01-25 RX ADMIN — PIPERACILLIN SODIUM AND TAZOBACTAM SODIUM 3.38 G: 3; .375 INJECTION, SOLUTION INTRAVENOUS at 17:03

## 2025-01-25 RX ADMIN — PIPERACILLIN SODIUM AND TAZOBACTAM SODIUM 3.38 G: 3; .375 INJECTION, SOLUTION INTRAVENOUS at 23:10

## 2025-01-25 RX ADMIN — GABAPENTIN 300 MG: 300 CAPSULE ORAL at 15:20

## 2025-01-25 RX ADMIN — KETOROLAC TROMETHAMINE 30 MG: 30 INJECTION, SOLUTION INTRAMUSCULAR at 13:19

## 2025-01-25 RX ADMIN — PROPOFOL 200 MG: 10 INJECTION, EMULSION INTRAVENOUS at 11:49

## 2025-01-25 RX ADMIN — PIPERACILLIN SODIUM AND TAZOBACTAM SODIUM 3.38 G: 3; .375 INJECTION, SOLUTION INTRAVENOUS at 05:36

## 2025-01-25 RX ADMIN — SUGAMMADEX 200 MG: 100 INJECTION, SOLUTION INTRAVENOUS at 13:19

## 2025-01-25 RX ADMIN — HYDROMORPHONE HYDROCHLORIDE 1 MG: 1 INJECTION, SOLUTION INTRAMUSCULAR; INTRAVENOUS; SUBCUTANEOUS at 00:18

## 2025-01-25 RX ADMIN — HYDRALAZINE HYDROCHLORIDE 10 MG: 20 INJECTION INTRAMUSCULAR; INTRAVENOUS at 12:33

## 2025-01-25 RX ADMIN — METOPROLOL SUCCINATE 25 MG: 25 TABLET, EXTENDED RELEASE ORAL at 20:04

## 2025-01-25 RX ADMIN — MIDAZOLAM 2 MG: 1 INJECTION INTRAMUSCULAR; INTRAVENOUS at 11:49

## 2025-01-25 RX ADMIN — FENTANYL CITRATE 50 MCG: 50 INJECTION, SOLUTION INTRAMUSCULAR; INTRAVENOUS at 12:15

## 2025-01-25 RX ADMIN — ROCURONIUM BROMIDE 20 MG: 10 INJECTION, SOLUTION INTRAVENOUS at 12:35

## 2025-01-25 RX ADMIN — HEPARIN SODIUM 5000 UNITS: 5000 INJECTION INTRAVENOUS; SUBCUTANEOUS at 05:36

## 2025-01-25 RX ADMIN — GABAPENTIN 300 MG: 300 CAPSULE ORAL at 20:04

## 2025-01-25 RX ADMIN — ROCURONIUM BROMIDE 50 MG: 10 INJECTION, SOLUTION INTRAVENOUS at 11:49

## 2025-01-25 RX ADMIN — PANTOPRAZOLE SODIUM 40 MG: 40 INJECTION, POWDER, FOR SOLUTION INTRAVENOUS at 08:44

## 2025-01-25 RX ADMIN — SODIUM CHLORIDE 100 ML/HR: 9 INJECTION, SOLUTION INTRAVENOUS at 00:19

## 2025-01-25 RX ADMIN — AMLODIPINE BESYLATE 2.5 MG: 5 TABLET ORAL at 20:04

## 2025-01-25 RX ADMIN — HEPARIN SODIUM 5000 UNITS: 5000 INJECTION INTRAVENOUS; SUBCUTANEOUS at 20:04

## 2025-01-25 RX ADMIN — FENTANYL CITRATE 50 MCG: 50 INJECTION, SOLUTION INTRAMUSCULAR; INTRAVENOUS at 13:22

## 2025-01-25 RX ADMIN — HYDRALAZINE HYDROCHLORIDE 10 MG: 20 INJECTION INTRAMUSCULAR; INTRAVENOUS at 12:27

## 2025-01-25 RX ADMIN — KETOROLAC TROMETHAMINE 15 MG: 30 INJECTION, SOLUTION INTRAMUSCULAR at 17:03

## 2025-01-25 RX ADMIN — KETOROLAC TROMETHAMINE 15 MG: 30 INJECTION, SOLUTION INTRAMUSCULAR at 23:10

## 2025-01-25 RX ADMIN — HYDROMORPHONE HYDROCHLORIDE 0.5 MG: 1 INJECTION, SOLUTION INTRAMUSCULAR; INTRAVENOUS; SUBCUTANEOUS at 13:21

## 2025-01-25 SDOH — HEALTH STABILITY: MENTAL HEALTH: CURRENT SMOKER: 1

## 2025-01-25 ASSESSMENT — PAIN SCALES - GENERAL
PAINLEVEL_OUTOF10: 0 - NO PAIN
PAINLEVEL_OUTOF10: 1
PAINLEVEL_OUTOF10: 0 - NO PAIN
PAINLEVEL_OUTOF10: 0 - NO PAIN
PAIN_LEVEL: 0
PAINLEVEL_OUTOF10: 0 - NO PAIN
PAINLEVEL_OUTOF10: 0 - NO PAIN
PAINLEVEL_OUTOF10: 7
PAINLEVEL_OUTOF10: 8

## 2025-01-25 ASSESSMENT — COGNITIVE AND FUNCTIONAL STATUS - GENERAL
MOBILITY SCORE: 24
DAILY ACTIVITIY SCORE: 24

## 2025-01-25 ASSESSMENT — PAIN DESCRIPTION - LOCATION: LOCATION: ABDOMEN

## 2025-01-25 ASSESSMENT — PAIN DESCRIPTION - DESCRIPTORS: DESCRIPTORS: ACHING

## 2025-01-25 ASSESSMENT — PAIN - FUNCTIONAL ASSESSMENT
PAIN_FUNCTIONAL_ASSESSMENT: 0-10

## 2025-01-25 NOTE — CARE PLAN
Problem: Pain  Goal: Takes deep breaths with improved pain control throughout the shift  Outcome: Progressing  Goal: Turns in bed with improved pain control throughout the shift  Outcome: Progressing  Goal: Walks with improved pain control throughout the shift  Outcome: Progressing  Goal: Performs ADL's with improved pain control throughout shift  Outcome: Progressing  Goal: Participates in PT with improved pain control throughout the shift  Outcome: Progressing  Goal: Free from opioid side effects throughout the shift  Outcome: Progressing  Goal: Free from acute confusion related to pain meds throughout the shift  Outcome: Progressing     Problem: Pain - Adult  Goal: Verbalizes/displays adequate comfort level or baseline comfort level  Outcome: Progressing     Problem: Safety - Adult  Goal: Free from fall injury  Outcome: Progressing     Problem: Discharge Planning  Goal: Discharge to home or other facility with appropriate resources  Outcome: Progressing     Problem: Chronic Conditions and Co-morbidities  Goal: Patient's chronic conditions and co-morbidity symptoms are monitored and maintained or improved  Outcome: Progressing     Problem: Nutrition  Goal: Nutrient intake appropriate for maintaining nutritional needs  Outcome: Progressing   The patient's goals for the shift include

## 2025-01-25 NOTE — SIGNIFICANT EVENT
Prior to the operation I reviewed the electronic health records including imaging and lab studies and obtain history from the patient and examined the patient.  I recommended laparoscopic cholecystectomy with cholangiogram with possible open operation.  I discussed the procedure and risks with the patient. The risks include bleeding, infection, bile leak, injury to surrounding structures such as the common bile duct/duodenum, cardiac/pulmonary/renal complications, post op diarrhea.  Patient agreed to have the operation and signed the electronic consent.

## 2025-01-25 NOTE — CARE PLAN
The patient's goals for the shift include      The clinical goals for the shift include Pain management      Problem: Pain  Goal: Takes deep breaths with improved pain control throughout the shift  Outcome: Progressing  Goal: Turns in bed with improved pain control throughout the shift  Outcome: Progressing  Goal: Walks with improved pain control throughout the shift  Outcome: Progressing  Goal: Performs ADL's with improved pain control throughout shift  Outcome: Progressing  Goal: Participates in PT with improved pain control throughout the shift  Outcome: Progressing  Goal: Free from opioid side effects throughout the shift  Outcome: Progressing  Goal: Free from acute confusion related to pain meds throughout the shift  Outcome: Progressing     Problem: Pain - Adult  Goal: Verbalizes/displays adequate comfort level or baseline comfort level  Outcome: Progressing     Problem: Safety - Adult  Goal: Free from fall injury  Outcome: Progressing     Problem: Discharge Planning  Goal: Discharge to home or other facility with appropriate resources  Outcome: Progressing     Problem: Chronic Conditions and Co-morbidities  Goal: Patient's chronic conditions and co-morbidity symptoms are monitored and maintained or improved  Outcome: Progressing     Problem: Nutrition  Goal: Nutrient intake appropriate for maintaining nutritional needs  Outcome: Progressing

## 2025-01-25 NOTE — OP NOTE
Laparoscopic cholecystectomy with cholangiogram. Operative Note     Date: 2025 - 2025  OR Location: [facility]    Name: Jose Luis Paulson, : 1970, Age: 54 y.o., MRN: [id number], Sex: male    Diagnosis  Pre-op Diagnosis      * Calculus of gallbladder with acute cholecystitis without obstruction [K80.00] Post-op Diagnosis     * Calculus of gallbladder with acute cholecystitis without obstruction [K80.00]     Procedures  Laparoscopic cholecystectomy with cholangiogram.  16112 - VT LAPS SURG CHOLECYSTECTOMY W/CHOLANGIOGRAPHY      Surgeons      * Raza Velasquez - Primary    Resident/Fellow/Other Assistant:  Surgeons and Role:  * No surgeons found with a matching role *    Staff:   Connorulator: Zita  Surgical Assistant: Leia Raymond Person: Cheyanne    Anesthesia Staff: Anesthesiologist: David Fajardo MD  C-AA: ELINOR Soares    Procedure Summary  Anesthesia: General  ASA: III  Estimated Blood Loss: 10 mL  Intra-op Medications:   Administrations occurring from 1400 to 1620 on 25:   Medication Name Total Dose   piperacillin-tazobactam (Zosyn) 3.375 g in dextrose (iso) IV 50 mL Cannot be calculated           Specimen:   ID Type Source Tests Collected by Time   1 : GALLBLADDER WITH STONES Tissue GALLBLADDER CHOLECYSTECTOMY SURGICAL PATHOLOGY EXAM Raza Velasquez MD 2025 1205   A : BILE CULTURE Fluid BILE FLUID STERILE FLUID CULTURE/SMEAR Raza Velasquez MD 2025 1221                 Drains and/or Catheters: * None in log *    Tourniquet Times:         Implants:     Findings: Cholelithiasis with acute cholecystitis.  Supraumbilical hernia containing preperitoneal fat.    Indications: Jose Luis Paulson is an 54 y.o. male who is having surgery for Calculus of gallbladder with acute cholecystitis without obstruction [K80.00].     The patient was seen in the preoperative area. The risks, benefits, complications, treatment options, non-operative alternatives, expected recovery and outcomes were  discussed with the patient. The possibilities of reaction to medication, pulmonary aspiration, injury to surrounding structures, bleeding, recurrent infection, the need for additional procedures, failure to diagnose a condition, and creating a complication requiring transfusion or operation were discussed with the patient. The patient concurred with the proposed plan, giving informed consent.  The site of surgery was properly noted/marked if necessary per policy. The patient has been actively warmed in preoperative area. Preoperative antibiotics have been ordered and given within 1 hours of incision. Venous thrombosis prophylaxis have been ordered including bilateral sequential compression devices    Procedure Details: Following informed consent patient was taken to the op room.  Huddle was performed.  Patient was placed into supine position.  The patient was given general anesthetic.  Sequential compression devices are in place and functioning.  The patient was given Ancef IV.  The abdomen was prepped and draped in sterile fashion.  A timeout was performed.  An supraumbilical midline skin incision was made and extended through the subcutaneous tissue.  The patient was found to have a supraumbilical hernia which had not been palpable preop.  The fascial defect was approximately 1.5 cm in width by 0.5 cm in length.  The hernia contained preperitoneal fat which was freed from surrounding tissue and excised.  A blunt 12 mm port was inserted under direct vision through the fascial defect.  The balloon was inflated.  The abdomen was insufflated carbon oxide to a pressure of 12 mmHg.  The scope was inserted.  Peritoneal cavity was inspected.  [The visualized portions of the stomach, small bowel, colon, omentum and liver were unremarkable].  Two 5 mm ports were placed under direct vision one into the epigastrium and one into the right upper quadrant.  The gallbladder was found to be acutely inflamed, distended and  thickened.  A needle aspirator was inserted into the gallbladder and 5 mL of cloudy bile aspirated and cultured.  The gallbladder was retracted and the triangle of Calot dissected.  The patient was noted to have a stone impacted in the neck of the gallbladder with significant inflammatory changes which made dissection difficult.  The cystic duct and cystic artery were freed from surrounding tissue.  The posterior portion of the liver plate was freed from the gallbladder.  2 structures were seen entering the gallbladder, the cystic duct and cystic artery.  The cystic duct was clipped adjacent to the neck the gallbladder then opened and milked retrograde.  A cholangiocatheter was percutaneously inserted into the right upper quadrant through a 14-gauge Angiocath.  The cholangiocatheter was placed into the cystic duct and clipped into place.  Cholangiograms were performed which [appeared to be normal].  The clip and cholangiocatheter removed.  The cystic duct was triply clipped proximal with care being taken not to injure or occlude the common bile duct.  The cystic duct was divided between clips.  The cystic artery was triply clipped proximal, singly clipped distal and divided.  A posterior branch of the cystic artery was doubly clipped proximal, singly clipped distal and divided.  The gallbladder was freed from the liver with electrocautery and [placed into an Endo Catch bag and] temporarily stored in the omentum.  The right upper quadrant was irrigated and suctioned till clear.  A 5 mm scope was placed in the epigastric port and the gallbladder removed through the umbilical port after opening the gallbladder and crushing and removing stones.  The fascia at the the umbilical port was closed with a running 2-0 PDS suture thereby repairing the hernia and obtaining an airtight closure.  The remaining ports were removed under direct vision and the abdomen deflated.  Half percent plain Marcaine was infiltrated into each of  the fascial incisions and skin closed with running 4-0 Vicryl subcuticular sutures.  Dressings were placed.  The patient was taken to the recovery in satisfactory condition having tolerated procedure well.  At the conclusion of the case the gallbladder was opened and found to contain stones.  The gallbladder and stones were sent to pathology.  The operation took significantly longer than average due to the inflammatory changes and the repair of the supraumbilical hernia qualifying for a modifier 22.  Complications:  None; patient tolerated the procedure well.    Disposition: PACU - hemodynamically stable.  Condition: stable         Task Performed by RNFA or Surgical Assistant:  Retraction, camera direction, skin closure          Raza Velasquez  Phone Number: [phone]

## 2025-01-25 NOTE — ANESTHESIA PREPROCEDURE EVALUATION
Patient: Jose Luis Paulson    Procedure Information       Date/Time: 01/25/25 1400    Procedure: Laparoscopic cholecystectomy with cholangiogram.  Possible open operation.    Location: [facility] 08 / [facility]    Surgeons: Raza Velasquez MD            Relevant Problems   Anesthesia (within normal limits)      Cardiac   (+) Hyperlipidemia   (+) Hypertension      GI   (+) GERD (gastroesophageal reflux disease)      Liver   (+) Acute cholecystitis   (+) Calculus of gallbladder with acute cholecystitis without obstruction      Musculoskeletal   (+) Generalized osteoarthritis      ID   (+) Herpesviral vesicular dermatitis       Clinical information reviewed:    Allergies  Meds               NPO Detail:  No data recorded     Physical Exam    Airway  Mallampati: III  TM distance: >3 FB  Neck ROM: full     Cardiovascular   Rhythm: regular  Rate: normal     Dental - normal exam     Pulmonary    Abdominal        Anesthesia Plan    History of general anesthesia?: yes  History of complications of general anesthesia?: no    ASA 3 - emergent     general     The patient is a current smoker.  Patient was previously instructed to abstain from smoking on day of procedure.  Patient did not smoke on day of procedure.    intravenous induction   Anesthetic plan and risks discussed with patient.  Use of blood products discussed with patient who.    Plan discussed with CAA.

## 2025-01-25 NOTE — ANESTHESIA PROCEDURE NOTES
Airway  Date/Time: 1/25/2025 11:51 AM  Urgency: elective      Staffing  Performed: CAA   Authorized by: David Fajardo MD    Performed by: David Fajardo MD  Patient location during procedure: OR    Indications and Patient Condition  Indications for airway management: anesthesia  Spontaneous Ventilation: absent  Sedation level: deep  Preoxygenated: yes  Patient position: sniffing  MILS maintained throughout  Mask difficulty assessment: 1 - vent by mask    Final Airway Details  Final airway type: endotracheal airway      Successful airway: ETT     Successful intubation technique: video laryngoscopy  Facilitating devices/methods: intubating stylet  Endotracheal tube insertion site: oral  Blade size: #4  ETT size (mm): 7.5  Cormack-Lehane Classification: grade I - full view of glottis  Placement verified by: chest auscultation   Measured from: teeth  ETT to teeth (cm): 22  Number of attempts at approach: 1

## 2025-01-25 NOTE — ANESTHESIA POSTPROCEDURE EVALUATION
Patient: Jose Luis Paulson    Procedure Summary       Date: 01/25/25 Room / Location: [facility] / [facility]    Anesthesia Start: 1144 Anesthesia Stop: 1339    Procedure: Laparoscopic cholecystectomy with cholangiogram. Diagnosis:       Calculus of gallbladder with acute cholecystitis without obstruction      (Calculus of gallbladder with acute cholecystitis without obstruction [K80.00])    Surgeons: Raza Velasquez MD Responsible Provider: David Fajardo MD    Anesthesia Type: general ASA Status: 3 - Emergent            Anesthesia Type: general    Vitals Value Taken Time   /61 01/25/25 1338   Temp 37.1 °C (98.8 °F) 01/25/25 1338   Pulse 79 01/25/25 1338   Resp 18 01/25/25 1338   SpO2 97 % 01/25/25 1338       Anesthesia Post Evaluation    Patient location during evaluation: PACU  Patient participation: complete - patient participated  Level of consciousness: awake  Pain score: 0  Pain management: adequate  Airway patency: patent  Cardiovascular status: acceptable  Respiratory status: acceptable  Hydration status: acceptable  Postoperative Nausea and Vomiting: none        There were no known notable events for this encounter.

## 2025-01-26 VITALS
OXYGEN SATURATION: 95 % | HEIGHT: 71 IN | TEMPERATURE: 97.9 F | BODY MASS INDEX: 31.92 KG/M2 | SYSTOLIC BLOOD PRESSURE: 121 MMHG | HEART RATE: 74 BPM | RESPIRATION RATE: 18 BRPM | DIASTOLIC BLOOD PRESSURE: 68 MMHG | WEIGHT: 228 LBS

## 2025-01-26 PROBLEM — K80.00 CALCULUS OF GALLBLADDER WITH ACUTE CHOLECYSTITIS WITHOUT OBSTRUCTION: Status: RESOLVED | Noted: 2025-01-25 | Resolved: 2025-01-26

## 2025-01-26 PROBLEM — K81.0 ACUTE CHOLECYSTITIS: Status: RESOLVED | Noted: 2025-01-24 | Resolved: 2025-01-26

## 2025-01-26 LAB
ALBUMIN SERPL BCP-MCNC: 3.6 G/DL (ref 3.4–5)
ALP SERPL-CCNC: 63 U/L (ref 33–120)
ALT SERPL W P-5'-P-CCNC: 27 U/L (ref 10–52)
ANION GAP SERPL CALC-SCNC: 12 MMOL/L (ref 10–20)
AST SERPL W P-5'-P-CCNC: 21 U/L (ref 9–39)
BILIRUB SERPL-MCNC: 1 MG/DL (ref 0–1.2)
BUN SERPL-MCNC: 15 MG/DL (ref 6–23)
CALCIUM SERPL-MCNC: 8.2 MG/DL (ref 8.6–10.3)
CHLORIDE SERPL-SCNC: 105 MMOL/L (ref 98–107)
CO2 SERPL-SCNC: 22 MMOL/L (ref 21–32)
CREAT SERPL-MCNC: 1.07 MG/DL (ref 0.5–1.3)
EGFRCR SERPLBLD CKD-EPI 2021: 81 ML/MIN/1.73M*2
ERYTHROCYTE [DISTWIDTH] IN BLOOD BY AUTOMATED COUNT: 11.6 % (ref 11.5–14.5)
GLUCOSE SERPL-MCNC: 108 MG/DL (ref 74–99)
GRAM STN SPEC: NORMAL
GRAM STN SPEC: NORMAL
HCT VFR BLD AUTO: 41.1 % (ref 41–52)
HGB BLD-MCNC: 14.2 G/DL (ref 13.5–17.5)
MCH RBC QN AUTO: 33.5 PG (ref 26–34)
MCHC RBC AUTO-ENTMCNC: 34.5 G/DL (ref 32–36)
MCV RBC AUTO: 97 FL (ref 80–100)
NRBC BLD-RTO: 0 /100 WBCS (ref 0–0)
PLATELET # BLD AUTO: 131 X10*3/UL (ref 150–450)
POTASSIUM SERPL-SCNC: 3.3 MMOL/L (ref 3.5–5.3)
PROT SERPL-MCNC: 6.1 G/DL (ref 6.4–8.2)
RBC # BLD AUTO: 4.24 X10*6/UL (ref 4.5–5.9)
SODIUM SERPL-SCNC: 136 MMOL/L (ref 136–145)
WBC # BLD AUTO: 8.2 X10*3/UL (ref 4.4–11.3)

## 2025-01-26 PROCEDURE — 85027 COMPLETE CBC AUTOMATED: CPT

## 2025-01-26 PROCEDURE — 2500000004 HC RX 250 GENERAL PHARMACY W/ HCPCS (ALT 636 FOR OP/ED)

## 2025-01-26 PROCEDURE — 36415 COLL VENOUS BLD VENIPUNCTURE: CPT

## 2025-01-26 PROCEDURE — 2500000002 HC RX 250 W HCPCS SELF ADMINISTERED DRUGS (ALT 637 FOR MEDICARE OP, ALT 636 FOR OP/ED): Performed by: REGISTERED NURSE

## 2025-01-26 PROCEDURE — 99024 POSTOP FOLLOW-UP VISIT: CPT | Performed by: SURGERY

## 2025-01-26 PROCEDURE — 2500000001 HC RX 250 WO HCPCS SELF ADMINISTERED DRUGS (ALT 637 FOR MEDICARE OP)

## 2025-01-26 PROCEDURE — 99238 HOSP IP/OBS DSCHRG MGMT 30/<: CPT | Performed by: REGISTERED NURSE

## 2025-01-26 PROCEDURE — 80053 COMPREHEN METABOLIC PANEL: CPT

## 2025-01-26 RX ORDER — AMLODIPINE BESYLATE 2.5 MG/1
2.5 TABLET ORAL DAILY
Qty: 30 TABLET | Refills: 0 | Status: SHIPPED | OUTPATIENT
Start: 2025-01-26

## 2025-01-26 RX ORDER — ATORVASTATIN CALCIUM 10 MG/1
10 TABLET, FILM COATED ORAL NIGHTLY
Qty: 30 TABLET | Refills: 0 | Status: SHIPPED | OUTPATIENT
Start: 2025-01-26

## 2025-01-26 RX ORDER — POTASSIUM CHLORIDE 20 MEQ/1
40 TABLET, EXTENDED RELEASE ORAL ONCE
Status: COMPLETED | OUTPATIENT
Start: 2025-01-26 | End: 2025-01-26

## 2025-01-26 RX ORDER — IBUPROFEN 600 MG/1
600 TABLET ORAL EVERY 6 HOURS PRN
Status: DISCONTINUED | OUTPATIENT
Start: 2025-01-26 | End: 2025-01-26

## 2025-01-26 RX ADMIN — SODIUM CHLORIDE 100 ML/HR: 9 INJECTION, SOLUTION INTRAVENOUS at 03:39

## 2025-01-26 RX ADMIN — POTASSIUM CHLORIDE 40 MEQ: 1500 TABLET, EXTENDED RELEASE ORAL at 08:57

## 2025-01-26 RX ADMIN — PIPERACILLIN SODIUM AND TAZOBACTAM SODIUM 3.38 G: 3; .375 INJECTION, SOLUTION INTRAVENOUS at 05:07

## 2025-01-26 RX ADMIN — KETOROLAC TROMETHAMINE 15 MG: 30 INJECTION, SOLUTION INTRAMUSCULAR at 12:00

## 2025-01-26 RX ADMIN — HEPARIN SODIUM 5000 UNITS: 5000 INJECTION INTRAVENOUS; SUBCUTANEOUS at 03:32

## 2025-01-26 RX ADMIN — PANTOPRAZOLE SODIUM 40 MG: 40 INJECTION, POWDER, FOR SOLUTION INTRAVENOUS at 08:57

## 2025-01-26 RX ADMIN — GABAPENTIN 300 MG: 300 CAPSULE ORAL at 08:57

## 2025-01-26 RX ADMIN — ACETAMINOPHEN 975 MG: 325 TABLET, FILM COATED ORAL at 08:57

## 2025-01-26 RX ADMIN — OXYCODONE HYDROCHLORIDE 5 MG: 5 TABLET ORAL at 03:31

## 2025-01-26 RX ADMIN — KETOROLAC TROMETHAMINE 15 MG: 30 INJECTION, SOLUTION INTRAMUSCULAR at 05:07

## 2025-01-26 ASSESSMENT — PAIN - FUNCTIONAL ASSESSMENT: PAIN_FUNCTIONAL_ASSESSMENT: 0-10

## 2025-01-26 ASSESSMENT — PAIN SCALES - GENERAL
PAINLEVEL_OUTOF10: 5 - MODERATE PAIN
PAINLEVEL_OUTOF10: 2

## 2025-01-26 ASSESSMENT — PAIN DESCRIPTION - ORIENTATION: ORIENTATION: RIGHT;LEFT

## 2025-01-26 NOTE — PROGRESS NOTES
"Jose Luis Paulson is a 54 y.o. male on day 2 of admission presenting with Acute cholecystitis.    Subjective   Patient quite disgruntled this morning. Asking to take a shower because he feels \"grubby\". States \"I can't eat that crap\" - pointing to his clear liquid tray.  His biggest concern is that he needs to watch the [employer] game at 3pm. \"I have money on that game\".    As far as his abdominal exam/postoperative course is concerned, he is passing gas and denies any pain at this time. He says he walked several laps in hallway.        Objective     Physical Exam  Constitutional:       General: He is not in acute distress.     Comments: Adult male, resting in hospital bed, visibly frustrated, but not vocalizing his concerns right away.  He is able to sit up and down unassisted and with ease   Eyes:      General: No scleral icterus.     Pupils: Pupils are equal, round, and reactive to light.   Cardiovascular:      Rate and Rhythm: Normal rate and regular rhythm.      Pulses: Normal pulses.   Pulmonary:      Effort: Pulmonary effort is normal. No respiratory distress.      Breath sounds: Normal breath sounds.      Comments: Pulling 2.5L on IS with ease  Abdominal:      General: Bowel sounds are normal. There is distension (Mild-moderate, although patient states he does not feel bloated).      Palpations: Abdomen is soft.      Tenderness: There is no abdominal tenderness.      Comments: 3 laparoscopic incisions covered with postop gauze/Tegaderm- no shadowing. No bruising    Skin:     General: Skin is warm.   Neurological:      General: No focal deficit present.      Mental Status: He is alert and oriented to person, place, and time.   Psychiatric:      Comments: Frustrated          Last Recorded Vitals  Blood pressure 128/82, pulse 73, temperature 36.6 °C (97.9 °F), temperature source Temporal, resp. rate 18, height 1.803 m (5' 11\"), weight 103 kg (228 lb), SpO2 98%.  Intake/Output last 3 Shifts:  I/O last 3 " completed shifts:  In: 3035 (29.3 mL/kg) [P.O.:900; I.V.:1935 (18.7 mL/kg); IV Piggyback:200]  Out: 765 (7.4 mL/kg) [Urine:750 (0.2 mL/kg/hr); Blood:15]  Weight: 103.4 kg     Relevant Results  Results for orders placed or performed during the hospital encounter of 01/24/25 (from the past 24 hours)   CBC   Result Value Ref Range    WBC 8.2 4.4 - 11.3 x10*3/uL    nRBC 0.0 0.0 - 0.0 /100 WBCs    RBC 4.24 (L) 4.50 - 5.90 x10*6/uL    Hemoglobin 14.2 13.5 - 17.5 g/dL    Hematocrit 41.1 41.0 - 52.0 %    MCV 97 80 - 100 fL    MCH 33.5 26.0 - 34.0 pg    MCHC 34.5 32.0 - 36.0 g/dL    RDW 11.6 11.5 - 14.5 %    Platelets 131 (L) 150 - 450 x10*3/uL   Comprehensive Metabolic Panel   Result Value Ref Range    Glucose 108 (H) 74 - 99 mg/dL    Sodium 136 136 - 145 mmol/L    Potassium 3.3 (L) 3.5 - 5.3 mmol/L    Chloride 105 98 - 107 mmol/L    Bicarbonate 22 21 - 32 mmol/L    Anion Gap 12 10 - 20 mmol/L    Urea Nitrogen 15 6 - 23 mg/dL    Creatinine 1.07 0.50 - 1.30 mg/dL    eGFR 81 >60 mL/min/1.73m*2    Calcium 8.2 (L) 8.6 - 10.3 mg/dL    Albumin 3.6 3.4 - 5.0 g/dL    Alkaline Phosphatase 63 33 - 120 U/L    Total Protein 6.1 (L) 6.4 - 8.2 g/dL    AST 21 9 - 39 U/L    Bilirubin, Total 1.0 0.0 - 1.2 mg/dL    ALT 27 10 - 52 U/L           Assessment/Plan   Assessment & Plan  Acute cholecystitis    Calculus of gallbladder with acute cholecystitis without obstruction    54 year old male with a history significant for testicular cancer s/p right radical orchiectomy and implant, HTN, low T, and h/o umbilical hernia repair with mesh returned to [facility] ED with ongoing/worsening abdominal pain. Labs/imaging now consistent with acute cholecystitis. Admitted to surgery for ongoing care.     Procedures:  1/25 - laparoscopic cholecystectomy and umbilical hernia repair - Dr. Velasquez     #acute calculous cholecystitis    > stone lodged at gallbladder neck; significant inflammation making surgical dissection challenging   - Regular diet  - scheduled  Tylenol/Gabapentin/Toradol   - stop narcotics given lack of use  - IV antiemetics PRN  - stop IV fluids     #h/o HTN  - continue home PO meds  - PRN IV hydralazine       Ppx:  SCDs, SQH     The patient will be seen and discussed with the attending surgeon Dr. Velasquez     Dispo:  Awaiting surgeon impression, but can likely be discharged this evening pending diet tolerance    I spent 20 minutes in the professional and overall care of this patient.  Greater than 50% of this time was spent counseling patient, reviewing plan of care, and in coordination of care.      Martha Cintron, APRN-CNP

## 2025-01-26 NOTE — CARE PLAN
Problem: Pain  Goal: Takes deep breaths with improved pain control throughout the shift  Outcome: Adequate for Discharge  Goal: Turns in bed with improved pain control throughout the shift  Outcome: Adequate for Discharge  Goal: Walks with improved pain control throughout the shift  Outcome: Adequate for Discharge  Goal: Performs ADL's with improved pain control throughout shift  Outcome: Adequate for Discharge  Goal: Participates in PT with improved pain control throughout the shift  Outcome: Adequate for Discharge  Goal: Free from opioid side effects throughout the shift  Outcome: Adequate for Discharge  Goal: Free from acute confusion related to pain meds throughout the shift  Outcome: Adequate for Discharge     Problem: Pain - Adult  Goal: Verbalizes/displays adequate comfort level or baseline comfort level  Outcome: Adequate for Discharge     Problem: Safety - Adult  Goal: Free from fall injury  Outcome: Adequate for Discharge     Problem: Discharge Planning  Goal: Discharge to home or other facility with appropriate resources  Outcome: Adequate for Discharge     Problem: Chronic Conditions and Co-morbidities  Goal: Patient's chronic conditions and co-morbidity symptoms are monitored and maintained or improved  Outcome: Adequate for Discharge     Problem: Nutrition  Goal: Nutrient intake appropriate for maintaining nutritional needs  Outcome: Adequate for Discharge

## 2025-01-26 NOTE — PROGRESS NOTES
General surgery attending note:  I examined and evaluated the patient.  I discussed the patient with the JONATHAN and reviewed the JONATHAN note.    Impression/plan:  Postop day #1 following laparoscopic cholecystectomy with cholangiogram for cholelithiasis with acute cholecystitis.  Patient is recovering well.  Discharge home.  Follow-up in 2 weeks.  No additional antibiotics needed.    Raza Velasquez MD

## 2025-01-26 NOTE — DISCHARGE SUMMARY
Discharge Diagnosis  Acute cholecystitis    Issues Requiring Follow-Up  Patient needs to see Dr. Velasquez in 2 weeks for follow up    Test Results Pending At Discharge  Pending Labs       Order Current Status    Surgical Pathology Exam Collected (01/25/25 1205)    Sterile Fluid Culture/Smear In process            Hospital Course   54 year old male with a history significant for testicular cancer s/p right radical orchiectomy and implant, HTN, low T, and h/o umbilical hernia repair with mesh returned to [facility] ED on 1/24 with ongoing/worsening abdominal pain. Labs/imaging consistent with acute cholecystitis. Admitted to surgery for ongoing care.     Underwent laparoscopic cholecystectomy with umbilical hernia repair on 1/25 with Dr. Velasquez. Patient had a gallstone lodged at the gallbladder neck causing significant inflammatory changes. Surgery took longer than average; additionally, his supraumbilical hernia was repaired at the time of surgery.    Postoperatively, patient did quite well. He was eagerly asking to be discharged in the early morning.     On day of discharge all vital signs, laboratory data, and physical exam findings were reviewed and patient was deemed appropriate for hospital discharge. At the time of discharge, patient's pain was controlled with oral analgesia (no narcotics were required), patient was urinating, passing gas, sleeping, and eating well. He was discharged home in satisfactory condition. Follow up appointment recommendations were made. Patient declined needing any pain medication prescriptions. OTC remedies were recommended. Discharge plan was discussed with the patient and all of the patient's questions were answered. Patient verbalized understanding of discharge instructions.      Pertinent Physical Exam At Time of Discharge  Physical Exam  Constitutional:       General: He is not in acute distress.     Comments: Adult male, resting in hospital bed, visibly frustrated, but not  vocalizing his concerns right away.  He is able to sit up and down unassisted and with ease   Eyes:      General: No scleral icterus.     Pupils: Pupils are equal, round, and reactive to light.   Cardiovascular:      Rate and Rhythm: Normal rate and regular rhythm.      Pulses: Normal pulses.   Pulmonary:      Effort: Pulmonary effort is normal. No respiratory distress.      Breath sounds: Normal breath sounds.      Comments: Pulling 2.5L on IS with ease  Abdominal:      General: Bowel sounds are normal. There is distension (Mild-moderate, although patient states he does not feel bloated).      Palpations: Abdomen is soft.      Tenderness: There is no abdominal tenderness.      Comments: 3 laparoscopic incisions covered with postop gauze/Tegaderm- no shadowing. No bruising    Skin:     General: Skin is warm.   Neurological:      General: No focal deficit present.      Mental Status: He is alert and oriented to person, place, and time.   Psychiatric:      Comments: Frustrated          Home Medications     Medication List      CHANGE how you take these medications     amLODIPine 2.5 mg tablet; Commonly known as: Norvasc; Take 1 tablet (2.5   mg) by mouth once daily. You should be taking this BP medication. Your BP   was high when you were in the hospital and you were receiving this. Please   follow up with PCP for refills and for BP monitoring; What changed:   additional instructions, Another medication with the same name was   removed. Continue taking this medication, and follow the directions you   see here.   atorvastatin 10 mg tablet; Commonly known as: Lipitor; Take 1 tablet (10   mg) by mouth once daily at bedtime. You should be taking this medication.   You were receiving it while in the hospital. Please see PCP for refills;   What changed: when to take this, additional instructions   metoprolol succinate XL 25 mg 24 hr tablet; Commonly known as:   Toprol-XL; Take 1 tablet (25 mg) by mouth once daily.; What  "changed: when   to take this   pantoprazole 40 mg EC tablet; Commonly known as: ProtoNix; Take 1 tablet   (40 mg) by mouth once daily.; What changed: when to take this     CONTINUE taking these medications     acetaminophen 500 mg tablet; Commonly known as: Tylenol   BD Safety-Regi Detachable Needl 3 mL 22 gauge x 1\" syringe; Generic drug:   syringe with needle, safety; USE TO INJECT IM TESTOSTERONE EVERY 2 WEEKS   Blood Pressure Cuff misc; Generic drug: miscellaneous medical supply;   Check BP daily and record a log   ibuprofen 600 mg tablet; Take 1 tablet (600 mg) by mouth every 6 hours   if needed for mild pain (1 - 3) or fever (temp greater than 38.0 C) for up   to 7 days.   needle (disp) 18 G 18 gauge x 1 1/2\" needle; Use for medication draw   ondansetron ODT 4 mg disintegrating tablet; Commonly known as:   Zofran-ODT; Dissolve 1 tablet (4 mg) in the mouth every 8 hours if needed   for nausea or vomiting for up to 5 days.   sildenafil 100 mg tablet; Commonly known as: Viagra   testosterone cypionate 200 mg/mL injection; Commonly known as:   Depo-Testosterone; Inject 1cc IM  every 2 weeks     STOP taking these medications     gabapentin 400 mg capsule; Commonly known as: Neurontin       Outpatient Follow-Up  Future Appointments   Date Time Provider Department Center   3/7/2025  8:00 AM Davi Estes MD [id number] [place]       Martha Cintron, APRN-CNP  "

## 2025-01-26 NOTE — CARE PLAN
The patient's goals for the shift include      The clinical goals for the shift include comfort      Problem: Pain  Goal: Takes deep breaths with improved pain control throughout the shift  Outcome: Progressing  Goal: Turns in bed with improved pain control throughout the shift  Outcome: Progressing  Goal: Walks with improved pain control throughout the shift  Outcome: Progressing  Goal: Performs ADL's with improved pain control throughout shift  Outcome: Progressing  Goal: Participates in PT with improved pain control throughout the shift  Outcome: Progressing  Goal: Free from opioid side effects throughout the shift  Outcome: Progressing  Goal: Free from acute confusion related to pain meds throughout the shift  Outcome: Progressing     Problem: Pain - Adult  Goal: Verbalizes/displays adequate comfort level or baseline comfort level  Outcome: Progressing     Problem: Safety - Adult  Goal: Free from fall injury  Outcome: Progressing     Problem: Discharge Planning  Goal: Discharge to home or other facility with appropriate resources  Outcome: Progressing     Problem: Chronic Conditions and Co-morbidities  Goal: Patient's chronic conditions and co-morbidity symptoms are monitored and maintained or improved  Outcome: Progressing     Problem: Nutrition  Goal: Nutrient intake appropriate for maintaining nutritional needs  Outcome: Progressing

## 2025-01-29 LAB
BACTERIA FLD CULT: NORMAL
GRAM STN SPEC: NORMAL
GRAM STN SPEC: NORMAL

## 2025-01-30 PROBLEM — Z09 POSTOP CHECK: Status: ACTIVE | Noted: 2025-01-30

## 2025-01-30 NOTE — PROGRESS NOTES
"History Of Present Illness  Jose Luis Paulson is a 54 y.o. male status post laparoscopic cholecystectomy with cholangiogram on January 25, 2025 for cholelithiasis with acute cholecystitis.  He was also found to have a supraumbilical hernia containing preperitoneal fat which was repaired.  He was discharged home on postop day #1.  He is tolerating a regular diet without nausea or vomiting.  He complains of itching and discomfort at the umbilical wound.     Last Recorded Vitals  Blood pressure 130/85, pulse 75, temperature 36.3 °C (97.3 °F), temperature source Temporal, resp. rate 16, height 1.803 m (5' 11\"), weight 104 kg (228 lb 9.6 oz), SpO2 99%.    Physical Exam  General: Well-developed, well-nourished, no acute distress, alert and oriented  Wounds: Epigastric and right upper quadrant wounds healing well.  Umbilical wound has fluid beneath the bandage with dermatitis.  All bandages removed.  No cellulitis or signs of infection.  Abdomen: Nondistended, soft, minimal incisional tenderness.      Assessment/Plan   Diagnoses and all orders for this visit:  Postop check  Recovering well.  Follow-up in 2 weeks.        Raza Velasquez MD  "

## 2025-01-31 ENCOUNTER — OFFICE VISIT (OUTPATIENT)
Dept: SURGERY | Facility: CLINIC | Age: 55
End: 2025-01-31
Payer: COMMERCIAL

## 2025-01-31 VITALS
TEMPERATURE: 97.3 F | WEIGHT: 228.6 LBS | HEART RATE: 75 BPM | RESPIRATION RATE: 16 BRPM | BODY MASS INDEX: 32 KG/M2 | HEIGHT: 71 IN | DIASTOLIC BLOOD PRESSURE: 85 MMHG | SYSTOLIC BLOOD PRESSURE: 130 MMHG | OXYGEN SATURATION: 99 %

## 2025-01-31 DIAGNOSIS — Z09 POSTOP CHECK: Primary | ICD-10-CM

## 2025-01-31 LAB
LABORATORY COMMENT REPORT: NORMAL
PATH REPORT.FINAL DX SPEC: NORMAL
PATH REPORT.GROSS SPEC: NORMAL
PATH REPORT.RELEVANT HX SPEC: NORMAL
PATH REPORT.TOTAL CANCER: NORMAL

## 2025-01-31 PROCEDURE — 99024 POSTOP FOLLOW-UP VISIT: CPT | Performed by: SURGERY

## 2025-01-31 PROCEDURE — 3075F SYST BP GE 130 - 139MM HG: CPT | Performed by: SURGERY

## 2025-01-31 PROCEDURE — 3008F BODY MASS INDEX DOCD: CPT | Performed by: SURGERY

## 2025-01-31 PROCEDURE — 3079F DIAST BP 80-89 MM HG: CPT | Performed by: SURGERY

## 2025-01-31 RX ORDER — NEEDLES, DISPOSABLE 25GX5/8"
NEEDLE, DISPOSABLE MISCELLANEOUS
COMMUNITY
Start: 2024-08-08

## 2025-01-31 ASSESSMENT — PAIN SCALES - GENERAL: PAINLEVEL_OUTOF10: 0-NO PAIN

## 2025-01-31 NOTE — LETTER
"January 31, 2025     Hai Sanchez DO  [address]  [facility] [address]    Patient: Jose Luis Paulson   YOB: 1970   Date of Visit: 1/31/2025       Dear Dr. Hai Sanchez DO:    Thank you for referring Jose Luis Paulson to me for evaluation. Below are my notes for this consultation.  If you have questions, please do not hesitate to call me. I look forward to following your patient along with you.       Sincerely,     Raza Velasquez MD      CC: No Recipients  ______________________________________________________________________________________    History Of Present Illness  Jose Luis Paulson is a 54 y.o. male status post laparoscopic cholecystectomy with cholangiogram on January 25, 2025 for cholelithiasis with acute cholecystitis.  He was also found to have a supraumbilical hernia containing preperitoneal fat which was repaired.  He was discharged home on postop day #1.  He is tolerating a regular diet without nausea or vomiting.  He complains of itching and discomfort at the umbilical wound.     Last Recorded Vitals  Blood pressure 130/85, pulse 75, temperature 36.3 °C (97.3 °F), temperature source Temporal, resp. rate 16, height 1.803 m (5' 11\"), weight 104 kg (228 lb 9.6 oz), SpO2 99%.    Physical Exam  General: Well-developed, well-nourished, no acute distress, alert and oriented  Wounds: Epigastric and right upper quadrant wounds healing well.  Umbilical wound has fluid beneath the bandage with dermatitis.  All bandages removed.  No cellulitis or signs of infection.  Abdomen: Nondistended, soft, minimal incisional tenderness.      Assessment/Plan  Diagnoses and all orders for this visit:  Postop check  Recovering well.  Follow-up in 2 weeks.        Raza Velasquez MD    "

## 2025-02-23 DIAGNOSIS — N52.9 MALE ERECTILE DYSFUNCTION, UNSPECIFIED: ICD-10-CM

## 2025-02-24 DIAGNOSIS — R79.89 LOW TESTOSTERONE: ICD-10-CM

## 2025-02-24 DIAGNOSIS — E29.1 HYPOGONADISM MALE: ICD-10-CM

## 2025-03-07 ENCOUNTER — APPOINTMENT (OUTPATIENT)
Dept: UROLOGY | Facility: CLINIC | Age: 55
End: 2025-03-07
Payer: COMMERCIAL

## 2025-03-17 DIAGNOSIS — E29.1 HYPOGONADISM IN MALE: ICD-10-CM

## 2025-03-17 DIAGNOSIS — Z12.5 PROSTATE CANCER SCREENING: ICD-10-CM

## 2025-03-17 RX ORDER — SILDENAFIL 100 MG/1
TABLET, FILM COATED ORAL
Qty: 30 TABLET | Refills: 6 | Status: SHIPPED | OUTPATIENT
Start: 2025-03-17

## 2025-03-18 LAB
ESTRADIOL SERPL-MCNC: 28 PG/ML
HCT VFR BLD AUTO: 54.2 % (ref 38.5–50)
LH SERPL-ACNC: 0.4 MIU/ML (ref 1.5–9.3)
PSA SERPL-MCNC: 0.86 NG/ML
TESTOST SERPL-MCNC: 282 NG/DL (ref 250–827)

## 2025-03-21 ENCOUNTER — TELEMEDICINE (OUTPATIENT)
Dept: UROLOGY | Facility: HOSPITAL | Age: 55
End: 2025-03-21
Payer: COMMERCIAL

## 2025-03-21 DIAGNOSIS — N52.9 ERECTILE DYSFUNCTION, UNSPECIFIED ERECTILE DYSFUNCTION TYPE: Primary | ICD-10-CM

## 2025-03-21 DIAGNOSIS — C62.91 MALIGNANT NEOPLASM OF RIGHT TESTIS, UNSPECIFIED WHETHER DESCENDED OR UNDESCENDED (MULTI): ICD-10-CM

## 2025-03-21 DIAGNOSIS — R79.89 LOW TESTOSTERONE: ICD-10-CM

## 2025-03-21 PROCEDURE — 99214 OFFICE O/P EST MOD 30 MIN: CPT | Performed by: UROLOGY

## 2025-03-21 RX ORDER — TESTOSTERONE CYPIONATE 200 MG/ML
INJECTION, SOLUTION INTRAMUSCULAR
Qty: 2 ML | Refills: 5 | Status: SHIPPED | OUTPATIENT
Start: 2025-03-21

## 2025-03-21 RX ORDER — SYRINGE WITH NEEDLE, 1 ML 25GX5/8"
SYRINGE, EMPTY DISPOSABLE MISCELLANEOUS
Qty: 2 EACH | Refills: 5 | Status: SHIPPED | OUTPATIENT
Start: 2025-03-21

## 2025-03-21 NOTE — PROGRESS NOTES
"FUV    Last seen - 8/8/24     HISTORY OF PRESENT ILLNESS:   Jose Luis Paulson is a 54 y.o. male who is being seen today for fuv    S/p right radical orchiectomy with placement of testicular implant on 03/29/22. 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 on AS with Dr. Estes     Was on T cyp in past, has been off for some time    Labs (3/17/25)    PSA 0.86  E 28  LH 0.4  H 54.2    Feels like his erections not as strong.      PAST MEDICAL HISTORY:  No past medical history on file.    PAST SURGICAL HISTORY:  Past Surgical History:   Procedure Laterality Date    OTHER SURGICAL HISTORY  09/08/2021    Umbilical hernia repair        ALLERGIES:   No Known Allergies     MEDICATIONS:   Current Outpatient Medications   Medication Instructions    acetaminophen (TYLENOL) 1,000 mg, Every 6 hours PRN    amLODIPine (NORVASC) 2.5 mg, oral, Daily, You should be taking this BP medication. Your BP was high when you were in the hospital and you were receiving this. Please follow up with PCP for refills and for BP monitoring    atorvastatin (LIPITOR) 10 mg, oral, Nightly, You should be taking this medication. You were receiving it while in the hospital. Please see PCP for refills    blunt needle, disposable 18 x 1 1/2 \" needle use for medication draw    metoprolol succinate XL (TOPROL-XL) 25 mg, oral, Daily    miscellaneous medical supply (Blood Pressure Cuff) misc Check BP daily and record a log    needle, disp, 18 G 18 gauge x 1 1/2\" needle Use for medication draw    pantoprazole (PROTONIX) 40 mg, oral, Daily    sildenafil (Viagra) 100 mg tablet TAKE 1 TABLET DAILY 1 HOUR BEFORE NEEDED    syringe with needle, safety (BD Safety-Regi Detachable Needl) 3 mL 22 gauge x 1\" syringe USE TO INJECT IM TESTOSTERONE EVERY 2 WEEKS    testosterone cypionate (Depo-Testosterone) 200 mg/mL injection Inject 1cc IM  every 2 weeks        PHYSICAL EXAM:  There were no vitals taken for this " "visit.  Constitutional: Patient appears well-developed and well-nourished. No distress.    Pulmonary/Chest: Effort normal. No respiratory distress.   Musculoskeletal: Normal range of motion.    Neurological: Alert and oriented to person, place, and time.  Psychiatric: Normal mood and affect. Behavior is normal. Thought content normal.      Labs  Lab Results   Component Value Date    TESTOSTERONE 282 03/17/2025    TESTOSTERONE 233 (L) 08/08/2024    TESTOSTERONE 259 07/01/2023     Lab Results   Component Value Date    ESTRADIOL 28 03/17/2025     Lab Results   Component Value Date    PSA 0.86 03/17/2025     Lab Results   Component Value Date    GFRMALE >90 07/01/2023     Lab Results   Component Value Date    CREATININE 1.07 01/26/2025     Lab Results   Component Value Date    CHOL 166 08/08/2024     Lab Results   Component Value Date    HDL 41.1 08/08/2024     Lab Results   Component Value Date    CHHDL 4.0 08/08/2024     Lab Results   Component Value Date    LDLF 67 09/16/2022     Lab Results   Component Value Date    VLDL 63 (H) 08/08/2024     Lab Results   Component Value Date    TRIG 314 (H) 08/08/2024     Lab Results   Component Value Date    HGBA1C 5.3 08/08/2024     No components found for: \"TESTOTMS\"  Lab Results   Component Value Date    TESTF 172.8 (H) 09/16/2022     No results found for: \"17HYDROXYPRO\"  Lab Results   Component Value Date    HCT 54.2 (H) 03/17/2025       Assessment:      1. Erectile dysfunction, unspecified erectile dysfunction type        2. Low testosterone        3. Malignant neoplasm of right testis, unspecified whether descended or undescended (Multi)          Jose Luis Paulson is a 54 y.o. male here for FUV     Plan:   1) Refill T Cyp  2) Labs and fu in 6m  3) FU with Dr Estes next month    Testosterone replacement therapy  The patient's lab work and clinical symptoms suggest that he has hypogonadism or testosterone deficiency.  I had a long discussion with the patient about the treatment " options of his condition. This included lifestyle modification as well as supplementation.   Exogenous testosterone as well as the use of selective estrogen receptor modulators (SERMs), aromatase inhibitors, human gonadotropins were discussed.  The several treatment options including various formulations were discussed. I explained the risks, benefits, mechanisms, and use of each therapy.  I described potential side effects of treatment. Common side effects include acne, erythrocytosis, breast tenderness, and changes in behavior.  I highlighted the effect of exogenous testosterone on the reproductive system, including decrease in testicular volume and the negative affect on sperm production which may result in sterility, which could be permanent.    We discussed that current data suggest that external testosterone therapy does not cause an increase in the risk of developing prostate cancer, and can also be used safely in patients after prostate cancer treatment.  It may however cause an increase in prostate volume and PSA.  We discussed that the there is controversy in replacement therapies effect on the cardiovascular system.     We also discussed the precautions necessary to keep the medication from contacting others, especially women and children, for whom contact with the medication can be particularly harmful.   He will use soap and water to wash his hands after application and will clean his skin before having skin-to-skin contact with others if he chooses gel application delivery of testosterone.  A monitoring schedule was discussed with includes routine evaluation of serum testosterone, hematocrit, lipid panel, and PSA.  No barriers to learning were identified.  After all of the patient's questions were satisfactorily answered, he expressed understanding of the risks of therapy.    I have personally reviewed the OARRS report for this patient. This report is scanned into the electronic medical record. I have  considered the  risks of abuse, dependence, addiction and diversion.    Controlled substance agreement was completed in the office.

## 2025-04-18 ENCOUNTER — APPOINTMENT (OUTPATIENT)
Age: 55
End: 2025-04-18
Payer: COMMERCIAL

## 2025-04-29 ENCOUNTER — APPOINTMENT (OUTPATIENT)
Dept: UROLOGY | Facility: HOSPITAL | Age: 55
End: 2025-04-29
Payer: COMMERCIAL

## 2025-05-23 ENCOUNTER — APPOINTMENT (OUTPATIENT)
Age: 55
End: 2025-05-23
Payer: COMMERCIAL

## 2025-07-10 DIAGNOSIS — I10 PRIMARY HYPERTENSION: ICD-10-CM

## 2025-07-10 RX ORDER — AMLODIPINE BESYLATE 2.5 MG/1
2.5 TABLET ORAL DAILY
Qty: 90 TABLET | Refills: 0 | OUTPATIENT
Start: 2025-07-10

## 2025-07-15 ENCOUNTER — TELEPHONE (OUTPATIENT)
Dept: PRIMARY CARE | Facility: CLINIC | Age: 55
End: 2025-07-15
Payer: COMMERCIAL

## 2025-07-15 NOTE — TELEPHONE ENCOUNTER
Patient called, he will be establishing care with Dr. Vickers on Friday. He has been trying to reach the office of Dr. Hai Sanchez to get his atorvastatin refilled. He wanted to know if Dr. Vickers would be agreeable to refill the medication for him as he is completely out of medication. I asked patient to call Dr. Sanchez's office again as he was prescribing the medication, he stated he has called his office several times and no one has gotten back to him. Patient is aware that Dr. Vickers is not in office and will be back tomorrow. Please advise.    Delores Medina, [address].

## 2025-07-18 ENCOUNTER — APPOINTMENT (OUTPATIENT)
Dept: PRIMARY CARE | Facility: CLINIC | Age: 55
End: 2025-07-18
Payer: COMMERCIAL

## 2025-07-23 ENCOUNTER — OFFICE VISIT (OUTPATIENT)
Dept: PRIMARY CARE | Facility: CLINIC | Age: 55
End: 2025-07-23
Payer: COMMERCIAL

## 2025-07-23 VITALS
OXYGEN SATURATION: 95 % | BODY MASS INDEX: 31.22 KG/M2 | WEIGHT: 223 LBS | HEIGHT: 71 IN | SYSTOLIC BLOOD PRESSURE: 137 MMHG | DIASTOLIC BLOOD PRESSURE: 75 MMHG | HEART RATE: 87 BPM

## 2025-07-23 DIAGNOSIS — G89.29 CHRONIC PAIN OF BOTH FEET: ICD-10-CM

## 2025-07-23 DIAGNOSIS — M79.671 CHRONIC PAIN OF BOTH FEET: ICD-10-CM

## 2025-07-23 DIAGNOSIS — N52.9 MALE ERECTILE DYSFUNCTION, UNSPECIFIED: ICD-10-CM

## 2025-07-23 DIAGNOSIS — C62.91 MALIGNANT NEOPLASM OF RIGHT TESTIS, UNSPECIFIED WHETHER DESCENDED OR UNDESCENDED (MULTI): ICD-10-CM

## 2025-07-23 DIAGNOSIS — N50.9 DISORDER OF MALE GENITAL ORGANS: ICD-10-CM

## 2025-07-23 DIAGNOSIS — Z00.00 WELLNESS EXAMINATION: Primary | ICD-10-CM

## 2025-07-23 DIAGNOSIS — I10 PRIMARY HYPERTENSION: ICD-10-CM

## 2025-07-23 DIAGNOSIS — K21.9 GASTROESOPHAGEAL REFLUX DISEASE WITHOUT ESOPHAGITIS: ICD-10-CM

## 2025-07-23 DIAGNOSIS — E78.5 HYPERLIPIDEMIA, UNSPECIFIED HYPERLIPIDEMIA TYPE: ICD-10-CM

## 2025-07-23 DIAGNOSIS — M79.672 CHRONIC PAIN OF BOTH FEET: ICD-10-CM

## 2025-07-23 PROCEDURE — 3075F SYST BP GE 130 - 139MM HG: CPT | Performed by: EMERGENCY MEDICINE

## 2025-07-23 PROCEDURE — 3078F DIAST BP <80 MM HG: CPT | Performed by: EMERGENCY MEDICINE

## 2025-07-23 PROCEDURE — 3008F BODY MASS INDEX DOCD: CPT | Performed by: EMERGENCY MEDICINE

## 2025-07-23 PROCEDURE — 99386 PREV VISIT NEW AGE 40-64: CPT | Performed by: EMERGENCY MEDICINE

## 2025-07-23 RX ORDER — ATORVASTATIN CALCIUM 10 MG/1
10 TABLET, FILM COATED ORAL NIGHTLY
Qty: 90 TABLET | Refills: 1 | Status: CANCELLED | OUTPATIENT
Start: 2025-07-23

## 2025-07-23 RX ORDER — PANTOPRAZOLE SODIUM 40 MG/1
40 TABLET, DELAYED RELEASE ORAL DAILY
Qty: 90 TABLET | Refills: 1 | Status: CANCELLED | OUTPATIENT
Start: 2025-07-23

## 2025-07-23 RX ORDER — SILDENAFIL 100 MG/1
TABLET, FILM COATED ORAL
Qty: 30 TABLET | Refills: 2 | Status: CANCELLED | OUTPATIENT
Start: 2025-07-23

## 2025-07-23 RX ORDER — METOPROLOL SUCCINATE 25 MG/1
25 TABLET, EXTENDED RELEASE ORAL DAILY
Qty: 90 TABLET | Refills: 1 | Status: CANCELLED | OUTPATIENT
Start: 2025-07-23

## 2025-07-23 RX ORDER — AMLODIPINE BESYLATE 2.5 MG/1
2.5 TABLET ORAL DAILY
Qty: 90 TABLET | Refills: 1 | Status: CANCELLED | OUTPATIENT
Start: 2025-07-23

## 2025-07-23 ASSESSMENT — PATIENT HEALTH QUESTIONNAIRE - PHQ9
1. LITTLE INTEREST OR PLEASURE IN DOING THINGS: NOT AT ALL
SUM OF ALL RESPONSES TO PHQ9 QUESTIONS 1 AND 2: 0
1. LITTLE INTEREST OR PLEASURE IN DOING THINGS: NOT AT ALL
2. FEELING DOWN, DEPRESSED OR HOPELESS: NOT AT ALL
SUM OF ALL RESPONSES TO PHQ9 QUESTIONS 1 AND 2: 0
2. FEELING DOWN, DEPRESSED OR HOPELESS: NOT AT ALL

## 2025-07-23 NOTE — PROGRESS NOTES
"Subjective   Patient ID: Jose Luis Paulson is a 54 y.o. male who presents for Establish Care and Ankle Pain.    Assessment/Plan   Problem List Items Addressed This Visit       Hyperlipidemia     Hypertension-takes Toprol and Norvasc.  Well-controlled    GERD-on PPI    Hyperlipidemia-on Lipitor.  Refill    Preventive care-will order smokers CT scan to rule out any lung tumors since he is chronic smoker    Testicular cancer-s/p surgery    Routine lab work    Follow-up in 3 months or as needed  Source of history: Nurse, Medical personnel, Medical record, Patient.  History limitation: None.    HPI  54-year-old man here for new patient physical    Has multiple social stressors    Has swelling in the left ankle and has seen multiple foot doctors without any resolution.  Has had lab work and x-rays before    Past medical history-hypertension, hyperlipidemia, GERD    Social history-chronic smoker  Allergies[1]    Current Medications[2]    Objective   Visit Vitals  /75   Pulse 87   Ht 1.803 m (5' 11\")   Wt 101 kg (223 lb)   SpO2 95%   BMI 31.10 kg/m²   Smoking Status Every Day   BSA 2.25 m²     Physical Exam  Vital signs as per nursing/MA documentation  General appearance: Alert and in no acute distress  HEENT: Normal Inspection  Neck - Normal Inspection  Respiratory : No respiratory distress. Lungs are clear   Cardiovascular: heart rate normal. No gallop  Back - normal inspection  Skin inspection:Warm  Musculoskeletal : No deformities  Neuro : Limited exam. Baseline    Review of Systems   Comprehensive review of systems as allowed by patient condition and nursing input is negative    Results including lab work, imaging reports were reviewed and wherever possible, independently verified    Family History[3]  Social History[4]  Medical History[5]  Surgical History[6]    Charting was completed using voice recognition technology and may include unintended errors.         [1] No Known Allergies  [2]   Current Outpatient " "Medications   Medication Sig Dispense Refill    amLODIPine (Norvasc) 2.5 mg tablet Take 1 tablet (2.5 mg) by mouth once daily. You should be taking this BP medication. Your BP was high when you were in the hospital and you were receiving this. Please follow up with PCP for refills and for BP monitoring 30 tablet 0    atorvastatin (Lipitor) 10 mg tablet Take 1 tablet (10 mg) by mouth once daily at bedtime. You should be taking this medication. You were receiving it while in the hospital. Please see PCP for refills 30 tablet 0    metoprolol succinate XL (Toprol-XL) 25 mg 24 hr tablet Take 1 tablet (25 mg) by mouth once daily. 90 tablet 3    needle, disp, 18 G 18 gauge x 1 1/2\" needle Use for medication draw 16 each 3    pantoprazole (ProtoNix) 40 mg EC tablet Take 1 tablet (40 mg) by mouth once daily. 90 tablet 3    sildenafil (Viagra) 100 mg tablet TAKE 1 TABLET DAILY 1 HOUR BEFORE NEEDED 30 tablet 6    testosterone cypionate (Depo-Testosterone) 200 mg/mL injection inject 1 ml intramuscularly every 2 weeks 2 mL 5    miscellaneous medical supply (Blood Pressure Cuff) misc Check BP daily and record a log 1 each 0    syringe with needle, safety (BD Safety-Regi Detachable Needl) 3 mL 22 gauge x 1\" syringe USE TO INJECT IM TESTOSTERONE EVERY 2 WEEKS (Patient not taking: Reported on 7/23/2025) 2 each 5     No current facility-administered medications for this visit.   [3] No family history on file.  [4]   Social History  Socioeconomic History    Marital status: Single   Tobacco Use    Smoking status: Every Day     Current packs/day: 1.00     Average packs/day: 1 pack/day for 15.0 years (15.0 ttl pk-yrs)     Types: Cigarettes    Smokeless tobacco: Never   Substance and Sexual Activity    Alcohol use: Yes     Comment: weekends    Drug use: Never     Social Drivers of Health     Financial Resource Strain: Low Risk  (1/24/2025)    Overall Financial Resource Strain (CARDIA)     Difficulty of Paying Living Expenses: Not very hard "   Food Insecurity: No Food Insecurity (1/24/2025)    Hunger Vital Sign     Worried About Running Out of Food in the Last Year: Never true     Ran Out of Food in the Last Year: Never true   Transportation Needs: No Transportation Needs (1/24/2025)    PRAPARE - Transportation     Lack of Transportation (Medical): No     Lack of Transportation (Non-Medical): No   Intimate Partner Violence: Not At Risk (1/24/2025)    Humiliation, Afraid, Rape, and Kick questionnaire     Fear of Current or Ex-Partner: No     Emotionally Abused: No     Physically Abused: No     Sexually Abused: No   Housing Stability: Low Risk  (1/24/2025)    Housing Stability Vital Sign     Unable to Pay for Housing in the Last Year: No     Number of Times Moved in the Last Year: 1     Homeless in the Last Year: No   [5] History reviewed. No pertinent past medical history.  [6]   Past Surgical History:  Procedure Laterality Date    OTHER SURGICAL HISTORY  09/08/2021    Umbilical hernia repair

## 2025-07-27 LAB
ALBUMIN SERPL-MCNC: 4.6 G/DL (ref 3.6–5.1)
ALBUMIN/GLOB SERPL: 1.8 (CALC) (ref 1–2.5)
ALP SERPL-CCNC: 87 U/L (ref 35–144)
ALT SERPL-CCNC: 51 U/L (ref 9–46)
AST SERPL-CCNC: 39 U/L (ref 10–35)
BASOPHILS # BLD AUTO: 39 CELLS/UL (ref 0–200)
BASOPHILS NFR BLD AUTO: 0.6 %
BILIRUB SERPL-MCNC: 0.7 MG/DL (ref 0.2–1.2)
BUN SERPL-MCNC: 9 MG/DL (ref 7–25)
BUN/CREAT SERPL: ABNORMAL (CALC) (ref 6–22)
CALCIUM SERPL-MCNC: 9.5 MG/DL (ref 8.6–10.3)
CHLORIDE SERPL-SCNC: 105 MMOL/L (ref 98–110)
CHOLEST SERPL-MCNC: 156 MG/DL
CHOLEST/HDLC SERPL: 3.8 (CALC)
CO2 SERPL-SCNC: 27 MMOL/L (ref 20–32)
CREAT SERPL-MCNC: 0.97 MG/DL (ref 0.7–1.3)
EGFRCR SERPLBLD CKD-EPI 2021: 92 ML/MIN/1.73M2
EOSINOPHIL # BLD AUTO: 111 CELLS/UL (ref 15–500)
EOSINOPHIL NFR BLD AUTO: 1.7 %
ERYTHROCYTE [DISTWIDTH] IN BLOOD BY AUTOMATED COUNT: 12.9 % (ref 11–15)
GLOBULIN SER CALC-MCNC: 2.5 G/DL (CALC) (ref 1.9–3.7)
GLUCOSE SERPL-MCNC: 125 MG/DL (ref 65–99)
HBA1C MFR BLD: 6.6 %
HCT VFR BLD AUTO: 49.1 % (ref 38.5–50)
HDLC SERPL-MCNC: 41 MG/DL
HGB BLD-MCNC: 16.5 G/DL (ref 13.2–17.1)
LDLC SERPL CALC-MCNC: 94 MG/DL (CALC)
LYMPHOCYTES # BLD AUTO: 1911 CELLS/UL (ref 850–3900)
LYMPHOCYTES NFR BLD AUTO: 29.4 %
MCH RBC QN AUTO: 34.6 PG (ref 27–33)
MCHC RBC AUTO-ENTMCNC: 33.6 G/DL (ref 32–36)
MCV RBC AUTO: 102.9 FL (ref 80–100)
MONOCYTES # BLD AUTO: 741 CELLS/UL (ref 200–950)
MONOCYTES NFR BLD AUTO: 11.4 %
NEUTROPHILS # BLD AUTO: 3699 CELLS/UL (ref 1500–7800)
NEUTROPHILS NFR BLD AUTO: 56.9 %
NONHDLC SERPL-MCNC: 115 MG/DL (CALC)
PLATELET # BLD AUTO: 156 THOUSAND/UL (ref 140–400)
PMV BLD REES-ECKER: 10.8 FL (ref 7.5–12.5)
POTASSIUM SERPL-SCNC: 4.1 MMOL/L (ref 3.5–5.3)
PROT SERPL-MCNC: 7.1 G/DL (ref 6.1–8.1)
RBC # BLD AUTO: 4.77 MILLION/UL (ref 4.2–5.8)
SODIUM SERPL-SCNC: 139 MMOL/L (ref 135–146)
TRIGL SERPL-MCNC: 112 MG/DL
TSH SERPL-ACNC: 0.45 MIU/L (ref 0.4–4.5)
WBC # BLD AUTO: 6.5 THOUSAND/UL (ref 3.8–10.8)

## 2025-07-30 ENCOUNTER — OFFICE VISIT (OUTPATIENT)
Dept: PRIMARY CARE | Facility: CLINIC | Age: 55
End: 2025-07-30
Payer: COMMERCIAL

## 2025-07-30 VITALS
SYSTOLIC BLOOD PRESSURE: 141 MMHG | WEIGHT: 225 LBS | HEART RATE: 80 BPM | OXYGEN SATURATION: 97 % | BODY MASS INDEX: 31.5 KG/M2 | HEIGHT: 71 IN | DIASTOLIC BLOOD PRESSURE: 80 MMHG

## 2025-07-30 DIAGNOSIS — D68.9 COAGULATION DEFECT, UNSPECIFIED: ICD-10-CM

## 2025-07-30 DIAGNOSIS — D80.9 IMMUNODEFICIENCY WITH PREDOMINANTLY ANTIBODY DEFECTS, UNSPECIFIED (MULTI): ICD-10-CM

## 2025-07-30 DIAGNOSIS — R21 SKIN RASH: Primary | ICD-10-CM

## 2025-07-30 DIAGNOSIS — E23.7: ICD-10-CM

## 2025-07-30 PROCEDURE — 99213 OFFICE O/P EST LOW 20 MIN: CPT | Performed by: EMERGENCY MEDICINE

## 2025-07-30 PROCEDURE — 3079F DIAST BP 80-89 MM HG: CPT | Performed by: EMERGENCY MEDICINE

## 2025-07-30 PROCEDURE — 3077F SYST BP >= 140 MM HG: CPT | Performed by: EMERGENCY MEDICINE

## 2025-07-30 PROCEDURE — 3008F BODY MASS INDEX DOCD: CPT | Performed by: EMERGENCY MEDICINE

## 2025-07-30 RX ORDER — PERMETHRIN 50 MG/G
CREAM TOPICAL ONCE
Qty: 60 G | Refills: 2 | Status: SHIPPED | OUTPATIENT
Start: 2025-07-30 | End: 2025-07-30

## 2025-07-30 NOTE — PROGRESS NOTES
"Subjective   Patient ID: Jose Luis Paulson is a 54 y.o. male who presents for No chief complaint on file..    Assessment/Plan   Problem List Items Addressed This Visit       Disorder of anterior pituitary (Multi)    Coagulation defect, unspecified - Primary    Immunodeficiency with predominantly antibody defects, unspecified (Multi)     Skin rash-in view of lesions being between fingers, there is a reasonable chance that this could be scabies.  Will treat him with permethrin    Hypertension-takes Toprol and Norvasc.  Well-controlled    GERD-on PPI    Hyperlipidemia-on Lipitor.  Refill    Preventive care-will order smokers CT scan to rule out any lung tumors since he is chronic smoker    Testicular cancer-s/p surgery    Routine lab work    Follow-up in 3 months or as needed  Source of history: Nurse, Medical personnel, Medical record, Patient.  History limitation: None.    HPI  54-year-old man here for office visit    States that he has had skin rash for a few days in both upper arms    Has multiple social stressors    Has swelling in the left ankle and has seen multiple foot doctors without any resolution.  Has had lab work and x-rays before    Past medical history-hypertension, hyperlipidemia, GERD    Social history-chronic smoker  Allergies[1]    Current Medications[2]    Objective   Visit Vitals  /80   Pulse 80   Ht 1.803 m (5' 11\")   Wt 102 kg (225 lb)   SpO2 97%   BMI 31.38 kg/m²   Smoking Status Every Day   BSA 2.26 m²     Physical Exam  Vital signs as per nursing/MA documentation  General appearance: Alert and in no acute distress  HEENT: Normal Inspection  Neck - Normal Inspection  Respiratory : No respiratory distress. Lungs are clear   Cardiovascular: heart rate normal. No gallop  Back - normal inspection  Skin inspection:Warm  Musculoskeletal : No deformities  Neuro : Limited exam. Baseline    Review of Systems   Comprehensive review of systems as allowed by patient condition and nursing input is " "negative    Results including lab work, imaging reports were reviewed and wherever possible, independently verified    Family History[3]  Social History[4]  Medical History[5]  Surgical History[6]    Charting was completed using voice recognition technology and may include unintended errors.           [1] No Known Allergies  [2]   Current Outpatient Medications   Medication Sig Dispense Refill    amLODIPine (Norvasc) 2.5 mg tablet Take 1 tablet (2.5 mg) by mouth once daily. You should be taking this BP medication. Your BP was high when you were in the hospital and you were receiving this. Please follow up with PCP for refills and for BP monitoring 30 tablet 0    atorvastatin (Lipitor) 10 mg tablet Take 1 tablet (10 mg) by mouth once daily at bedtime. You should be taking this medication. You were receiving it while in the hospital. Please see PCP for refills 30 tablet 0    metoprolol succinate XL (Toprol-XL) 25 mg 24 hr tablet Take 1 tablet (25 mg) by mouth once daily. 90 tablet 3    miscellaneous medical supply (Blood Pressure Cuff) misc Check BP daily and record a log 1 each 0    needle, disp, 18 G 18 gauge x 1 1/2\" needle Use for medication draw 16 each 3    pantoprazole (ProtoNix) 40 mg EC tablet Take 1 tablet (40 mg) by mouth once daily. 90 tablet 3    sildenafil (Viagra) 100 mg tablet TAKE 1 TABLET DAILY 1 HOUR BEFORE NEEDED 30 tablet 6    testosterone cypionate (Depo-Testosterone) 200 mg/mL injection inject 1 ml intramuscularly every 2 weeks 2 mL 5    syringe with needle, safety (BD Safety-Regi Detachable Needl) 3 mL 22 gauge x 1\" syringe USE TO INJECT IM TESTOSTERONE EVERY 2 WEEKS (Patient not taking: Reported on 7/30/2025) 2 each 5     No current facility-administered medications for this visit.   [3] No family history on file.  [4]   Social History  Socioeconomic History    Marital status: Single   Tobacco Use    Smoking status: Every Day     Current packs/day: 1.00     Average packs/day: 1 pack/day for " 15.0 years (15.0 ttl pk-yrs)     Types: Cigarettes    Smokeless tobacco: Never   Substance and Sexual Activity    Alcohol use: Yes     Comment: weekends    Drug use: Never     Social Drivers of Health     Financial Resource Strain: Low Risk  (1/24/2025)    Overall Financial Resource Strain (CARDIA)     Difficulty of Paying Living Expenses: Not very hard   Food Insecurity: No Food Insecurity (1/24/2025)    Hunger Vital Sign     Worried About Running Out of Food in the Last Year: Never true     Ran Out of Food in the Last Year: Never true   Transportation Needs: No Transportation Needs (1/24/2025)    PRAPARE - Transportation     Lack of Transportation (Medical): No     Lack of Transportation (Non-Medical): No   Intimate Partner Violence: Not At Risk (1/24/2025)    Humiliation, Afraid, Rape, and Kick questionnaire     Fear of Current or Ex-Partner: No     Emotionally Abused: No     Physically Abused: No     Sexually Abused: No   Housing Stability: Low Risk  (1/24/2025)    Housing Stability Vital Sign     Unable to Pay for Housing in the Last Year: No     Number of Times Moved in the Last Year: 1     Homeless in the Last Year: No   [5] History reviewed. No pertinent past medical history.  [6]   Past Surgical History:  Procedure Laterality Date    OTHER SURGICAL HISTORY  09/08/2021    Umbilical hernia repair

## 2025-08-29 DIAGNOSIS — Z12.5 PROSTATE CANCER SCREENING: ICD-10-CM

## 2025-08-29 DIAGNOSIS — E29.1 HYPOGONADISM IN MALE: ICD-10-CM

## (undated) DEVICE — RETRIEVAL SYSTEM, MONARCH, 10MM DISP ENDOSCOPIC

## (undated) DEVICE — STRIP, SKIN CLOSURE, STERI STRIP, REINFORCED, 0.5 X 4 IN

## (undated) DEVICE — DRESSING, TRANSPARENT, TEGADERM, 2-3/8 X 2-3/4 IN

## (undated) DEVICE — DRESSING, TELFA, 3X4

## (undated) DEVICE — HOLSTER, ELECTROSURGERY ACCESSORY, STERILE

## (undated) DEVICE — TROCAR SYSTEM, BALLOON, KII GELPORT, 12 X 100MM

## (undated) DEVICE — CLIP, ENDO APPLIER LIGAMAX 5MM

## (undated) DEVICE — Device

## (undated) DEVICE — CATHETER, CHOLANGIOGRAM, 4.5 FR, 45 CM, 18 IN

## (undated) DEVICE — CONNECTOR, IV, ONE-LINK, NEEDLE-FREE W/NEUTRAL FLUID DISPLACEMENT, LF

## (undated) DEVICE — SLEEVE, VASO PRESS, CALF GARMENT, MEDIUM, GREEN

## (undated) DEVICE — CAUTERY, PENCIL, PUSH BUTTON, SMOKE EVAC, 70MM

## (undated) DEVICE — SCISSOR TIP, ENDOCUT, LAPAROSCOPIC

## (undated) DEVICE — CATHETER, IV, ANGIOCATH, 14 G X 1.88 IN, FEP POLYMER

## (undated) DEVICE — SLEEVE, KII, Z-THREAD, 5X100CM

## (undated) DEVICE — CARE KIT, LAPAROSCOPIC, ADVANCED

## (undated) DEVICE — SOLUTION, INJECTION, CONTRAST, IOTHALAMATE MEGLUMINE 60%, CONRAY, 50 CC, VIAL

## (undated) DEVICE — GRASPER TIP, LAPCLINCH, DISP

## (undated) DEVICE — TROCAR, KII OPTICAL BLADELESS 5MM Z THREAD 100MM LNGTH